# Patient Record
Sex: FEMALE | Race: WHITE | Employment: FULL TIME | URBAN - METROPOLITAN AREA
[De-identification: names, ages, dates, MRNs, and addresses within clinical notes are randomized per-mention and may not be internally consistent; named-entity substitution may affect disease eponyms.]

---

## 2017-04-05 ENCOUNTER — OFFICE VISIT (OUTPATIENT)
Dept: OBGYN CLINIC | Age: 27
End: 2017-04-05

## 2017-04-05 VITALS
HEART RATE: 74 BPM | WEIGHT: 227 LBS | DIASTOLIC BLOOD PRESSURE: 86 MMHG | HEIGHT: 66 IN | BODY MASS INDEX: 36.48 KG/M2 | SYSTOLIC BLOOD PRESSURE: 118 MMHG

## 2017-04-05 DIAGNOSIS — Z30.09 GENERAL COUNSELING AND ADVICE FOR CONTRACEPTIVE MANAGEMENT: ICD-10-CM

## 2017-04-05 DIAGNOSIS — Z11.3 SCREENING FOR VENEREAL DISEASE: ICD-10-CM

## 2017-04-05 DIAGNOSIS — N89.8 VAGINA ITCHING: ICD-10-CM

## 2017-04-05 DIAGNOSIS — N76.0 ACUTE VAGINITIS: Primary | ICD-10-CM

## 2017-04-05 LAB
PH BODY FLUID, POCT, PHBFPOCT: 4.5
SOURCE POCT, SRCEPOCT: NORMAL
WET MOUNT POCT, WMPOCT: NEGATIVE

## 2017-04-05 RX ORDER — OXCARBAZEPINE 300 MG/1
TABLET, FILM COATED ORAL
Refills: 0 | COMMUNITY
Start: 2017-03-19

## 2017-04-05 RX ORDER — DEXTROAMPHETAMINE SACCHARATE, AMPHETAMINE ASPARTATE, DEXTROAMPHETAMINE SULFATE AND AMPHETAMINE SULFATE 7.5; 7.5; 7.5; 7.5 MG/1; MG/1; MG/1; MG/1
30 TABLET ORAL
COMMUNITY

## 2017-04-05 RX ORDER — TERCONAZOLE 80 MG/1
80 SUPPOSITORY VAGINAL
Qty: 3 SUPPOSITORY | Refills: 0 | Status: SHIPPED | OUTPATIENT
Start: 2017-04-05 | End: 2017-04-08

## 2017-04-05 NOTE — PROGRESS NOTES
Vaginitis evaluation    Chief Complaint   Vaginitis      HPI  32 y.o. female complains of white vaginal discharge for 1-2 days. Patient's last menstrual period was 03/06/2017 (approximate). She has additional symptoms at this time - burning and itching. No pelvic pain or fever. The patient denies aggravating factors. No specific STI concerns, but sx did start a couple of days after IC, new partner. She denies exposure to new chemicals ot hygenic agents  Previous treatment included: none    Dx'd with bipolar. Started on Trileptal.  Stopped OCPs at recommendation of the pharmacist (was told cancels out the hormones). Has noticed improvement on the Trileptal.    Overdue for AE (1/2016). 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 Last labs WNL in 2013 per pt.  Screening for malignant neoplasm of the cervix 9/30/14    ASCUS +HPV     Past Surgical History:   Procedure Laterality Date    HX COLPOSCOPY  10/21/14    ECC - benign endocervix, detatched frag of sq mucosa sugg of HPV effect; 4:00 - HPV effect, focal atypia favor reactive; 11:00 - benign.  HX TONSILLECTOMY  1994    HX WISDOM TEETH EXTRACTION       Social History     Occupational History    Not on file. Social History Main Topics    Smoking status: Former Smoker     Packs/day: 0.50     Years: 7.00     Types: Cigarettes    Smokeless tobacco: Never Used      Comment: Never used vapor or e-cigs     Alcohol use 0.0 oz/week     0 Glasses of wine, 0 Cans of beer per week      Comment: Occasionally.  Drug use: No      Comment: Has used amphetamines 1 yr ago, but none presently.     Sexual activity: Not Currently     Partners: Male     Birth control/ protection: Pill     Family History   Problem Relation Age of Onset    Hypertension Mother    [facility] Anxiety Mother     Thyroid Disease Mother     Diabetes Mother      Pre- DM per pt. [facility] Other Mother      Per pt. mother h/o HPV and had Hysterectomy    Bipolar Disorder Father     Bipolar Disorder Sister         Allergies   Allergen Reactions    Pcn [Penicillins] Rash     Prior to Admission medications    Medication Sig Start Date End Date Taking? Authorizing Provider   OXcarbazepine (TRILEPTAL) 300 mg tablet TK 1 T PO HS 3/19/17  Yes Historical Provider   dextroamphetamine-amphetamine (ADDERALL) 30 mg tablet Take 30 mg by mouth. Yes Historical Provider   escitalopram oxalate (LEXAPRO) 20 mg tablet Take 1 Tab by mouth daily. 11/6/16  Yes Isla Spurling, MD   escitalopram oxalate (LEXAPRO) 5 mg tablet TAKE 1 TABLET BY MOUTH DAILY 11/6/16  Yes Isla Spurling, MD   PRENATAL NO.82/IRON/FOLATE #2 (TL FOLATE PO) Take  by mouth. Yes Historical Provider   cetirizine (ZYRTEC) 10 mg tablet Take 10 mg by mouth daily. Yes Historical Provider   triamcinolone (NASACORT) 55 mcg nasal inhaler 2 sprays daily. Yes Historical Provider   escitalopram oxalate (LEXAPRO) 5 mg tablet Take 1 Tab by mouth daily. 11/6/16   Isla Spurling, MD   escitalopram oxalate (LEXAPRO) 20 mg tablet TAKE 1 TABLET BY MOUTH DAILY 11/6/16   Isla Spurling, MD   norethindrone ac-eth estradiol ([facility] 1.5/30, 21,) 1.5-30 mg-mcg tab Take 1 Tab by mouth daily.  6/6/16   Delgado Pardo MD                      Review of Systems - History obtained from the patient  Constitutional: negative for weight loss, fever, night sweats  Breast: negative for breast lumps, nipple discharge, galactorrhea  GI: negative for change in bowel habits, abdominal pain, black or bloody stools  : negative for frequency, dysuria, hematuria  MSK: negative for back pain, joint pain, muscle pain  Skin: negative for itching, rash, hives  Neuro: negative for dizziness, headache, confusion, weakness  Psych: se HPI  Heme/lymph: negative for bleeding, bruising, pallor       Objective:    Visit Vitals    /86    Pulse 74    Ht 5' 6\" (1.676 m)    Wt 227 lb (103 kg)    LMP 03/06/2017 (Approximate)    BMI 36.64 kg/m2       Physical Exam:   PHYSICAL EXAMINATION    Constitutional  · Appearance: well-nourished, well developed, alert, in no acute distress    HENT  · Head and Face: appears normal    Genitourinary  · External Genitalia: normal appearance for age, no discharge present, no tenderness present, no inflammatory lesions present, no masses present, no atrophy present  · Vagina:  Small amount thick white discharge present, otherwise normal vaginal vault without central or paravaginal defects, no inflammatory lesions present, no masses present  · Bladder: non-tender to palpation  · Urethra: appears normal  · Cervix: normal   · Uterus: normal size, shape and consistency  · Adnexa: no adnexal tenderness present, no adnexal masses present  · Perineum: perineum within normal limits, no evidence of trauma, no rashes or skin lesions present  · Anus: anus within normal limits, no hemorrhoids present  · Inguinal Lymph Nodes: no lymphadenopathy present    Skin  · General Inspection: no rash, no lesions identified    Neurologic/Psychiatric  · Mental Status:  · Orientation: grossly oriented to person, place and time  · Mood and Affect: mood normal, affect appropriate          Results for orders placed or performed in visit on 04/05/17   AMB POC SMEAR, STAIN & INTERPRET, WET MOUNT   Result Value Ref Range    Wet mount (POC) negative     Narrative    WP/LILLIAM    Hypae: negative  Buds: negative  Whiff: negative    Wet Prep:  Trich: negative  Clue cells: negative  Hyphae: negative  Buds: negative  WBC's: normal   AMB POC PH, BODY FLUID EXCEPT BLOOD   Result Value Ref Range    pH,Body fluid (POC) 4.5     Source         Assessment:   Vaginal discharge and itching. WP/KOH neg.   Probably yeast.  Overdue AE  H/o ASCUS +HPV with HPV effect/koilocytosis on bx  Started on Trileptal, stopped OCPs    Plan:   NuSwab for G/C/T. Declines BV/yeast.  If sx persist, can send when she returns for her AE. Will treat empirically for yeast -> eRX Terazol-3  AE scheduled  Discussed while Trileptal may affect the metabolism of the OCPs and decrease efficacy, probably still better than nothing. Info given on [facility]. Can discuss further at next visit. Orders Placed This Encounter    CT/NG/T.VAGINALIS AMPLIFICATION     Order Specific Question:   Specimen type     Answer:   Vaginal [516]    AMB POC WET PREP (AKA STAIN, INTERPRET, WET MOUNT)    AMB POC PH, BODY FLUID EXCEPT BLOOD    terconazole (TERAZOL 3) 80 mg vaginal suppository     Sig: Insert 1 Suppository into vagina nightly for 3 days.      Dispense:  3 Suppository     Refill:  0

## 2017-04-07 LAB
C TRACH RRNA SPEC QL NAA+PROBE: NEGATIVE
N GONORRHOEA RRNA SPEC QL NAA+PROBE: NEGATIVE
T VAGINALIS RRNA SPEC QL NAA+PROBE: NEGATIVE

## 2017-05-03 ENCOUNTER — OFFICE VISIT (OUTPATIENT)
Dept: OBGYN CLINIC | Age: 27
End: 2017-05-03

## 2017-05-03 VITALS
BODY MASS INDEX: 36 KG/M2 | DIASTOLIC BLOOD PRESSURE: 96 MMHG | HEIGHT: 66 IN | HEART RATE: 95 BPM | WEIGHT: 224 LBS | SYSTOLIC BLOOD PRESSURE: 142 MMHG

## 2017-05-03 DIAGNOSIS — R87.610 ASCUS WITH POSITIVE HIGH RISK HPV CERVICAL: ICD-10-CM

## 2017-05-03 DIAGNOSIS — Z01.419 ENCOUNTER FOR GYNECOLOGICAL EXAMINATION: Primary | ICD-10-CM

## 2017-05-03 DIAGNOSIS — R87.810 ASCUS WITH POSITIVE HIGH RISK HPV CERVICAL: ICD-10-CM

## 2017-05-03 DIAGNOSIS — N92.6 IRREGULAR MENSES: ICD-10-CM

## 2017-05-03 NOTE — PROGRESS NOTES
Annual exam ages 21-44    Elena Madera is a ,  32 y.o. female [facility] Patient's last menstrual period was 2017 (approximate). , who presents for her annual checkup. Since her last annual GYN exam about one year ago on 16, she has had the following changes in her health history: None    ADDITIONAL CONCERNS:  She is having pain and bleeding hemrrhoids. With regard to the Gardasil vaccine, she is older than the FDA approved age to receive it. Menstrual status:    Her periods are moderate in flow. She is using three to five pads or tampons per day, timing irregular, may vary by a few wks. She denies dysmenorrhea. She denies premenstrual symptoms. Contraception:    The current method of family planning is none. Would like IUD    Sexual history:     She  reports that she does not currently engage in sexual activity but has had male partners.; She reports using the following method of birth control/protection: Was on OCPs, but d/c'd at the recommendation of pharmacy when she started her trileptal.        Pap and Mammogram History:    Pap (14): ASCUS +HPV  colpo (10/21/14): ECC - benign endocervix, detatched frag of sq mucosa sugg of HPV effect; 4:00 - HPV effect, focal atypia favor reactive; 11:00 - benign. Pap (16) ASCUS +HPV  colpo (3/14/16): ECC negative; cvx bx @6:00=koilocytosis/HPV effect    The patient has never had a mammogram.    Breast Cancer History/Substance Abuse:    Past Medical History:   Diagnosis Date    Abnormal Pap smear of cervix 16    ASCUS ; HPV +    Anxiety associated with depression     HSV-1 infection 2016    serum screening    Low iron     Reports not being anemic. Last labs WNL in 2013 per pt.     Screening for malignant neoplasm of the cervix 14    ASCUS +HPV     Past Surgical History:   Procedure Laterality Date    HX COLPOSCOPY  10/21/14    ECC - benign endocervix, detatched frag of sq mucosa sugg of HPV effect; 4:00 - HPV effect, focal atypia favor reactive; 11:00 - benign.  HX TONSILLECTOMY      HX WISDOM TEETH EXTRACTION       OB History    Para Term  AB SAB TAB Ectopic Multiple Living   1 1 1       1      # Outcome Date GA Lbr Charles/2nd Weight Sex Delivery Anes PTL Lv   1 Term 09 40w0d 32:00 7 lb 8 oz (3.402 kg) F VAGINAL DELI EPI N Y      Birth Comments: [place]          Current Outpatient Prescriptions   Medication Sig Dispense Refill    OXcarbazepine (TRILEPTAL) 300 mg tablet TK 1 T PO HS  0    dextroamphetamine-amphetamine (ADDERALL) 30 mg tablet Take 30 mg by mouth.  escitalopram oxalate (LEXAPRO) 20 mg tablet Take 1 Tab by mouth daily. 90 Tab 1    PRENATAL NO.82/IRON/FOLATE #2 (TL FOLATE PO) Take  by mouth.  norethindrone ac-eth estradiol (LOESTRIN 1.5/30, 21,) 1.5-30 mg-mcg tab Take 1 Tab by mouth daily. 84 Tab 2    cetirizine (ZYRTEC) 10 mg tablet Take 10 mg by mouth daily.  triamcinolone (NASACORT) 55 mcg nasal inhaler 2 sprays daily.  escitalopram oxalate (LEXAPRO) 5 mg tablet Take 1 Tab by mouth daily. 90 Tab 1    escitalopram oxalate (LEXAPRO) 5 mg tablet TAKE 1 TABLET BY MOUTH DAILY 90 Tab 0    escitalopram oxalate (LEXAPRO) 20 mg tablet TAKE 1 TABLET BY MOUTH DAILY 90 Tab 0     Allergies: Pcn [penicillins]   Social History     Social History    Marital status: SINGLE     Spouse name: N/A    Number of children: N/A    Years of education: N/A     Occupational History    Not on file. Social History Main Topics    Smoking status: Former Smoker     Packs/day: 0.50     Years: 7.00     Types: Cigarettes    Smokeless tobacco: Never Used      Comment: Never used vapor or e-cigs     Alcohol use 0.0 oz/week     0 Glasses of wine, 0 Cans of beer per week      Comment: Occasionally.  Drug use: No      Comment: Has used amphetamines 1 yr ago, but none presently.     Sexual activity: Not Currently     Partners: Male     Birth control/ protection: Pill     Other Topics Concern    Not on file     Social History Narrative     Tobacco History:  reports that she has quit smoking. Her smoking use included Cigarettes. She has a 3.50 pack-year smoking history. She has never used smokeless tobacco.  Alcohol Abuse:  reports that she drinks alcohol. Drug Abuse:  reports that she does not use illicit drugs. Patient Active Problem List   Diagnosis Code    Anxiety associated with depression F41.8    Abnormal pap [id number]     Family History   Problem Relation Age of Onset    Hypertension Mother    [facility] Anxiety Mother     Thyroid Disease Mother     Diabetes Mother      Pre- DM per pt.    [facility] Other Mother      Per pt. mother h/o HPV and had Hysterectomy    Bipolar Disorder Father     Bipolar Disorder Sister        Review of Systems - History obtained from the patient  Constitutional: negative for weight loss, fever, night sweats  HEENT: negative for hearing loss, earache, congestion, snoring, sorethroat  CV: negative for chest pain, palpitations, edema  Resp: negative for cough, shortness of breath, wheezing  GI: negative for change in bowel habits, abdominal pain, black or bloody stools  : negative for frequency, dysuria, hematuria, vaginal discharge  MSK: negative for back pain, joint pain, muscle pain  Breast: negative for breast lumps, nipple discharge, galactorrhea  Skin :negative for itching, rash, hives  Neuro: negative for dizziness, headache, confusion, weakness  Psych: negative for anxiety, depression, change in mood  Heme/lymph: negative for bleeding, bruising, pallor    Physical Exam    Visit Vitals    BP (!) 142/96    Pulse 95    Ht 5' 6\" (1.676 m)    Wt 224 lb (101.6 kg)    LMP 04/07/2017 (Approximate)    BMI 36.15 kg/m2       Constitutional  · Appearance: well-nourished, well developed, alert, in no acute distress    HENT  · Head and Face: appears normal    Neck  · Inspection/Palpation: normal appearance, no masses or tenderness  · Lymph Nodes: no lymphadenopathy present  · Thyroid: gland size normal, nontender, no nodules or masses present on palpation    Chest  · Respiratory Effort: breathing unlabored  · Auscultation: normal breath sounds    Cardiovascular  · Heart:  · Auscultation: regular rate and rhythm without murmur    Breasts  · Inspection of Breasts: breasts symmetrical, no skin changes, no discharge present, nipple appearance normal, no skin retraction present  · Palpation of Breasts and Axillae: no masses present on palpation, no breast tenderness  · Axillary Lymph Nodes: no lymphadenopathy present    Gastrointestinal  · Abdominal Examination: abdomen non-tender to palpation, normal bowel sounds, no masses present  · Liver and spleen: no hepatomegaly present, spleen not palpable  · Hernias: no hernias identified    Genitourinary  · External Genitalia: normal appearance for age, no discharge present, no tenderness present, no inflammatory lesions present, no masses present, no atrophy present  · Vagina: normal vaginal vault without central or paravaginal defects, no discharge present, no inflammatory lesions present, no masses present  · Bladder: non-tender to palpation  · Urethra: appears normal  · Cervix: normal   · Uterus: normal size, shape and consistency  · Adnexa: no adnexal tenderness present, no adnexal masses present  · Perineum: perineum within normal limits, no evidence of trauma, no rashes or skin lesions present  · Anus: anus within normal limits, hemorrhoid present  · Inguinal Lymph Nodes: no lymphadenopathy present @ 5:00    Skin  · General Inspection: no rash, no lesions identified    Neurologic/Psychiatric  · Mental Status:  · Orientation: grossly oriented to person, place and time  · Mood and Affect: mood normal, affect appropriate            Assessment & Plan:  · Routine gynecologic examination  · ASCUS +HPV -> pap/HPV today. · Irregular cycles.   Previously on OCPs, but d/c'd at pharmacist's recommendation when she started Trileptal.  Would like IUD. Reviewed Domingo Sargent. Will plan on Mirena. Rec Motrin 600-800mg 2hrs prior to appt, take with food. If she needs colposcopy, recommend doing this prior to IUD insertion. · Her current medical status is satisfactory with no evidence of significant gynecologic issues. · Counseled re: diet, exercise, healthy lifestyle  · Return for yearly wellness visits  · Pt counseled regarding co-testing for high risk HPV with pap  · Patient verbalized understanding    Orders Placed This Encounter    PAP IG, HPV AND RFX HPV 96/75,32([id number])     Scheduling Instructions:      Pap (9/30/14): ASCUS +HPV      colpo (10/21/14): ECC - benign endocervix, detatched frag of sq mucosa sugg of HPV effect; 4:00 - HPV effect, focal atypia favor reactive; 11:00 - benign. Pap (1/25/16) ASCUS +HPV      colpo (3/14/16): ECC negative; cvx bx @6:00=koilocytosis/HPV effect     Order Specific Question:   Pap Source? Answer:   Cervical and Endocervical     Order Specific Question:   Total Hysterectomy? Answer:   No     Order Specific Question:   Supracervical Hysterectomy? Answer:   No     Order Specific Question:   Post Menopausal?     Answer:   No     Order Specific Question:   Hormone Therapy? Answer:   No     Order Specific Question:   IUD? Answer:   No     Order Specific Question:   Abnormal Bleeding? Answer:   No     Order Specific Question:   Pregnant     Answer:   No     Order Specific Question:   Post Partum? Answer:    No

## 2017-05-03 NOTE — PROGRESS NOTES
Annual exam ages 21-44    Donte Clinton is a ,  32 y.o. female [facility] Patient's last menstrual period was 2017 (approximate). , who presents for her annual checkup. Since her last annual GYN exam about one year ago on 16, she has had the following changes in her health history: None    ADDITIONAL CONCERNS:  She is having pain and bleeding hemrrhoids. With regard to the Gardasil vaccine, she is older than the FDA approved age to receive it. Menstrual status:    Her periods are moderate in flow. She is using three to five pads or tampons per day, usually regular and last 26-30 days. She denies dysmenorrhea. She denies premenstrual symptoms. Contraception:    The current method of family planning is none. Would like Melissa IUD    Sexual history:     She  reports that she does not currently engage in sexual activity but has had male partners.; She reports using the following method of birth control/protection: Pill. .        Pap and Mammogram History:    Pap (14): ASCUS +HPV  colpo (10/21/14): ECC - benign endocervix, detatched frag of sq mucosa sugg of HPV effect; 4:00 - HPV effect, focal atypia favor reactive; 11:00 - benign. Pap (16) ASCUS +HPV  colpo (3/14/16): ECC negative; cvx bx @6:00=koilocytosis/HPV effect    The patient has never had a mammogram.    Breast Cancer History/Substance Abuse:    Past Medical History:   Diagnosis Date    Abnormal Pap smear of cervix 16    ASCUS ; HPV +    Anxiety associated with depression     HSV-1 infection 2016    serum screening    Low iron     Reports not being anemic. Last labs WNL in  per pt.  Screening for malignant neoplasm of the cervix 14    ASCUS +HPV     Past Surgical History:   Procedure Laterality Date    HX COLPOSCOPY  10/21/14    ECC - benign endocervix, detatched frag of sq mucosa sugg of HPV effect; 4:00 - HPV effect, focal atypia favor reactive; 11:00 - benign.     HX TONSILLECTOMY      HX WISDOM TEETH EXTRACTION       OB History    Para Term  AB SAB TAB Ectopic Multiple Living   1 1 1       1      # Outcome Date GA Lbr Charles/2nd Weight Sex Delivery Anes PTL Lv   1 Term 09 40w0d 32:00 7 lb 8 oz (3.402 kg) F VAGINAL DELI EPI N Y      Birth Comments: [place]          Current Outpatient Prescriptions   Medication Sig Dispense Refill    OXcarbazepine (TRILEPTAL) 300 mg tablet TK 1 T PO HS  0    dextroamphetamine-amphetamine (ADDERALL) 30 mg tablet Take 30 mg by mouth.  escitalopram oxalate (LEXAPRO) 20 mg tablet Take 1 Tab by mouth daily. 90 Tab 1    PRENATAL NO.82/IRON/FOLATE #2 (TL FOLATE PO) Take  by mouth.  norethindrone ac-eth estradiol (LOESTRIN 1.5/30, 21,) 1.5-30 mg-mcg tab Take 1 Tab by mouth daily. 84 Tab 2    cetirizine (ZYRTEC) 10 mg tablet Take 10 mg by mouth daily.  triamcinolone (NASACORT) 55 mcg nasal inhaler 2 sprays daily.  escitalopram oxalate (LEXAPRO) 5 mg tablet Take 1 Tab by mouth daily. 90 Tab 1    escitalopram oxalate (LEXAPRO) 5 mg tablet TAKE 1 TABLET BY MOUTH DAILY 90 Tab 0    escitalopram oxalate (LEXAPRO) 20 mg tablet TAKE 1 TABLET BY MOUTH DAILY 90 Tab 0     Allergies: Pcn [penicillins]   Social History     Social History    Marital status: SINGLE     Spouse name: N/A    Number of children: N/A    Years of education: N/A     Occupational History    Not on file. Social History Main Topics    Smoking status: Former Smoker     Packs/day: 0.50     Years: 7.00     Types: Cigarettes    Smokeless tobacco: Never Used      Comment: Never used vapor or e-cigs     Alcohol use 0.0 oz/week     0 Glasses of wine, 0 Cans of beer per week      Comment: Occasionally.  Drug use: No      Comment: Has used amphetamines 1 yr ago, but none presently.     Sexual activity: Not Currently     Partners: Male     Birth control/ protection: Pill     Other Topics Concern    Not on file     Social History Narrative Tobacco History:  reports that she has quit smoking. Her smoking use included Cigarettes. She has a 3.50 pack-year smoking history. She has never used smokeless tobacco.  Alcohol Abuse:  reports that she drinks alcohol. Drug Abuse:  reports that she does not use illicit drugs. Patient Active Problem List   Diagnosis Code    Anxiety associated with depression F41.8    Abnormal pap [id number]     Family History   Problem Relation Age of Onset    Hypertension Mother    [facility] Anxiety Mother     Thyroid Disease Mother     Diabetes Mother      Pre- DM per pt.    [facility] Other Mother      Per pt. mother h/o HPV and had Hysterectomy    Bipolar Disorder Father     Bipolar Disorder Sister        Review of Systems - History obtained from the patient  Constitutional: negative for weight loss, fever, night sweats  HEENT: negative for hearing loss, earache, congestion, snoring, sorethroat  CV: negative for chest pain, palpitations, edema  Resp: negative for cough, shortness of breath, wheezing  GI: negative for change in bowel habits, abdominal pain, black or bloody stools  : negative for frequency, dysuria, hematuria, vaginal discharge  MSK: negative for back pain, joint pain, muscle pain  Breast: negative for breast lumps, nipple discharge, galactorrhea  Skin :negative for itching, rash, hives  Neuro: negative for dizziness, headache, confusion, weakness  Psych: negative for anxiety, depression, change in mood  Heme/lymph: negative for bleeding, bruising, pallor    Physical Exam    Visit Vitals    BP (!) 142/96    Pulse 95    Ht 5' 6\" (1.676 m)    Wt 224 lb (101.6 kg)    LMP 04/07/2017 (Approximate)    BMI 36.15 kg/m2       Constitutional  · Appearance: well-nourished, well developed, alert, in no acute distress    HENT  · Head and Face: appears normal    Neck  · Inspection/Palpation: normal appearance, no masses or tenderness  · Lymph Nodes: no lymphadenopathy present  · Thyroid: gland size normal, nontender, no nodules or masses present on palpation    Chest  · Respiratory Effort: breathing unlabored  · Auscultation: normal breath sounds    Cardiovascular  · Heart:  · Auscultation: regular rate and rhythm without murmur    Breasts  · Inspection of Breasts: breasts symmetrical, no skin changes, no discharge present, nipple appearance normal, no skin retraction present  · Palpation of Breasts and Axillae: no masses present on palpation, no breast tenderness  · Axillary Lymph Nodes: no lymphadenopathy present    Gastrointestinal  · Abdominal Examination: abdomen non-tender to palpation, normal bowel sounds, no masses present  · Liver and spleen: no hepatomegaly present, spleen not palpable  · Hernias: no hernias identified    Genitourinary  · External Genitalia: normal appearance for age, no discharge present, no tenderness present, no inflammatory lesions present, no masses present, no atrophy present  · Vagina: normal vaginal vault without central or paravaginal defects, no discharge present, no inflammatory lesions present, no masses present  · Bladder: non-tender to palpation  · Urethra: appears normal  · Cervix: normal   · Uterus: normal size, shape and consistency  · Adnexa: no adnexal tenderness present, no adnexal masses present  · Perineum: perineum within normal limits, no evidence of trauma, no rashes or skin lesions present  · Anus: anus within normal limits, no hemorrhoids present  · Inguinal Lymph Nodes: no lymphadenopathy present    Skin  · General Inspection: no rash, no lesions identified    Neurologic/Psychiatric  · Mental Status:  · Orientation: grossly oriented to person, place and time  · Mood and Affect: mood normal, affect appropriate      No results found for any visits on 05/03/17. Assessment & Plan:  · Routine gynecologic examination  · Her current medical status is satisfactory with no evidence of significant gynecologic issues.   · Counseled re: diet, exercise, healthy lifestyle  · Return for yearly wellness visits  · Gardisil counseling provided  · Pt counseled regarding co-testing for high risk HPV with pap  · Rec screening mammo  · Patient verbalized understanding

## 2017-05-03 NOTE — PATIENT INSTRUCTIONS

## 2017-05-05 LAB
CYTOLOGIST CVX/VAG CYTO: ABNORMAL
CYTOLOGY CVX/VAG DOC THIN PREP: ABNORMAL
CYTOLOGY HISTORY:: ABNORMAL
DX ICD CODE: ABNORMAL
DX ICD CODE: ABNORMAL
HPV I/H RISK 1 DNA CVX QL PROBE+SIG AMP: NEGATIVE
Lab: ABNORMAL
OTHER STN SPEC: ABNORMAL
PATH REPORT.FINAL DX SPEC: ABNORMAL
PATHOLOGIST CVX/VAG CYTO: ABNORMAL
STAT OF ADQ CVX/VAG CYTO-IMP: ABNORMAL

## 2017-05-09 ENCOUNTER — TELEPHONE (OUTPATIENT)
Dept: OBGYN CLINIC | Age: 27
End: 2017-05-09

## 2017-05-09 NOTE — TELEPHONE ENCOUNTER
We can do the IUD insertion if she would like. However, I generally recommend that the colposcopy be done first -- it is possible that the IUD could be inadvertently pulled out while we are doing biopsies.

## 2017-05-09 NOTE — TELEPHONE ENCOUNTER
Call received at 1:19pm      32year old patient last seen in the office on 5/3/17. Patient calling back the office to check on her lab results. Patient advised of MD reviewed lab results and recommendations and was placed on the schedule have a colposcopy on 7/17/17 at 2:00pm.      Patient thinks she might be on her cycle and would like to turn the appointment to insertion of IUD if she can not have the colposcopy done. Patient does not want to wait another whole month to get her cycle.    Please advise

## 2017-05-09 NOTE — PROGRESS NOTES
Patient advised of MD reviewed lab results and recommendations. Patient placed on the schedule for 5/17/17 for colposcopy. Patient verbalized understanding.  This nurse will send message to MD regarding patient request.

## 2017-05-15 NOTE — TELEPHONE ENCOUNTER
Patient aware of MD recommendations and per DM, patient ok to have IUD insertion appt for 05/17/17 and R/S colpo for 06/26/17.

## 2017-05-17 ENCOUNTER — OFFICE VISIT (OUTPATIENT)
Dept: OBGYN CLINIC | Age: 27
End: 2017-05-17

## 2017-05-17 VITALS
HEART RATE: 82 BPM | BODY MASS INDEX: 36 KG/M2 | HEIGHT: 66 IN | DIASTOLIC BLOOD PRESSURE: 78 MMHG | SYSTOLIC BLOOD PRESSURE: 126 MMHG | WEIGHT: 224 LBS

## 2017-05-17 DIAGNOSIS — Z32.02 NEGATIVE PREGNANCY TEST: ICD-10-CM

## 2017-05-17 DIAGNOSIS — N92.6 IRREGULAR MENSES: ICD-10-CM

## 2017-05-17 DIAGNOSIS — Z30.430 ENCOUNTER FOR IUD INSERTION: Primary | ICD-10-CM

## 2017-05-17 LAB
HCG URINE, QL. (POC): NEGATIVE
VALID INTERNAL CONTROL?: YES

## 2017-05-17 NOTE — PATIENT INSTRUCTIONS
Intrauterine Device (IUD) Insertion: Care Instructions  Your Care Instructions    The intrauterine device (IUD) is a very effective method of birth control. It is a small, plastic, T-shaped device that contains copper or hormones. The doctor inserts the IUD into your uterus. A plastic string tied to the end of the IUD hangs down through the cervix into the vagina. There are two types of IUDs. The copper IUD is effective for up to 10 years. The hormonal IUD is effective for either 3 years or 5 years, depending on which IUD is used. The hormonal IUD also reduces menstrual bleeding and cramping. Both types of IUD damage or kill the man's sperm. This means that the woman's egg does not join with the sperm. IUDs also change the lining of the uterus so that the egg does not lodge there. The IUD is most likely to work well for women who have been pregnant before. Some women who have never been pregnant have more trouble keeping the IUD in the uterus. They also may have more pain and cramping after insertion. Follow-up care is a key part of your treatment and safety. Be sure to make and go to all appointments, and call your doctor if you are having problems. It's also a good idea to know your test results and keep a list of the medicines you take. How can you care for yourself at home? · You may experience some mild cramping and light bleeding (spotting) for 1 or 2 days. Use a hot water bottle or a heating pad set on low on your belly for pain. · Take an over-the-counter pain medicine, such as acetaminophen (Tylenol), ibuprofen (Advil, Motrin), and naproxen (Aleve) if needed. Read and follow all instructions on the label. · Do not take two or more pain medicines at the same time unless the doctor told you to. Many pain medicines have acetaminophen, which is Tylenol. Too much acetaminophen (Tylenol) can be harmful. · Check the string of your IUD after every period.  To do this, insert a finger into your vagina and feel for the cervix, which is at the top of the vagina and feels harder than the rest of your vagina. You should be able to feel the thin, plastic string coming out of the opening of your cervix. If you cannot feel the string, use another form of birth control and make an appointment with your doctor to have the string checked. · If the IUD comes out, save it and call your doctor. Be sure to use another form of birth control while the IUD is out. · Use latex condoms to protect against sexually transmitted infections (STIs), such as gonorrhea and chlamydia. An IUD does not protect you from STIs. Having one sex partner (who does not have STIs and does not have sex with anyone else) is a good way to avoid STIs. When should you call for help? Call 911 anytime you think you may need emergency care. For example, call if:  · You passed out (lost consciousness). · You have sudden, severe pain in your belly or pelvis. Call your doctor now or seek immediate medical care if:  · You have new belly or pelvic pain. · You have severe vaginal bleeding. This means that you are soaking through your usual pads or tampons each hour for 2 or more hours. · You are dizzy or lightheaded, or you feel like you may faint. · You have a fever and pelvic pain or vaginal discharge. · You have pelvic pain that is getting worse. Watch closely for changes in your health, and be sure to contact your doctor if:  · You cannot feel the string, or the IUD comes out. · You feel sick to your stomach, or you vomit. · You think you may be pregnant. Where can you learn more? Go to http://aurea-matheus.info/. Enter L676 in the search box to learn more about \"Intrauterine Device (IUD) Insertion: Care Instructions. \"  Current as of: May 30, 2016  Content Version: 11.2  © 8968-8775 [employer].  Care instructions adapted under license by [employer] (which disclaims liability or warranty for this information). If you have questions about a medical condition or this instruction, always ask your healthcare professional. [place] [address] any warranty or liability for your use of this information.

## 2017-05-31 ENCOUNTER — OFFICE VISIT (OUTPATIENT)
Dept: OBGYN CLINIC | Age: 27
End: 2017-05-31

## 2017-05-31 VITALS
SYSTOLIC BLOOD PRESSURE: 124 MMHG | HEIGHT: 66 IN | DIASTOLIC BLOOD PRESSURE: 80 MMHG | BODY MASS INDEX: 36.48 KG/M2 | WEIGHT: 227 LBS

## 2017-05-31 DIAGNOSIS — Z32.02 NEGATIVE PREGNANCY TEST: ICD-10-CM

## 2017-05-31 DIAGNOSIS — R87.612 LGSIL ON PAP SMEAR OF CERVIX: Primary | ICD-10-CM

## 2017-05-31 LAB
HCG URINE, QL. (POC): NEGATIVE
VALID INTERNAL CONTROL?: YES

## 2017-05-31 NOTE — PATIENT INSTRUCTIONS
Colposcopy: What to Expect at [address] [place] [address] may feel some soreness in your vagina for a day or two if you had a biopsy. Some vaginal bleeding or discharge is normal for up to a week after a biopsy. The discharge may be dark-colored if a solution was put on your cervix. You can use a sanitary pad for the bleeding. It may take a week or two for you to get the test results. This care sheet gives you a general idea about how long it will take for you to recover. But each person recovers at a different pace. Follow the steps below to feel better as quickly as possible. How can you care for yourself at home? Activity  · You can return to work and most daily activities right after the test.  Exercise  · Do not exercise for 1 day after the test.  Medicines  · Your doctor will tell you if and when you can restart your medicines. He or she will also give you instructions about taking any new medicines. · If you take blood thinners, such as warfarin (Coumadin), clopidogrel (Plavix), or aspirin, be sure to talk to your doctor. He or she will tell you if and when to start taking those medicines again. Make sure that you understand exactly what your doctor wants you to do. · Take an over-the-counter pain medicine, such as acetaminophen (Tylenol), ibuprofen (Advil, Motrin), or naproxen (Aleve). Be safe with medicines. Read and follow all instructions on the label. Do not take two or more pain medicines at the same time unless the doctor told you to. Many pain medicines have acetaminophen, which is Tylenol. Too much acetaminophen (Tylenol) can be harmful. Other instructions  · Use a pad if you have some bleeding. · Do not douche, have sexual intercourse, or use tampons for 1 week if you had a biopsy. This will allow time for your cervix to heal.  · You can take a bath or shower anytime after the test.  Follow-up care is a key part of your treatment and safety.  Be sure to make and go to all appointments, and call your doctor if you are having problems. It's also a good idea to know your test results and keep a list of the medicines you take. When should you call for help? Call your doctor now or seek immediate medical care if:  · You have severe vaginal bleeding. This means that you are soaking through your usual pads or tampons each hour for 2 or more hours. · You have pain that does not get better after you take pain medicine. · You have signs of infection, such as:  ¨ Increased pain. ¨ Bad-smelling vaginal discharge. ¨ A fever. Watch closely for any changes in your health, and be sure to contact your doctor if:  · You have questions or concerns. Where can you learn more? Go to http://aurea-matheus.info/. Enter M523 in the search box to learn more about \"Colposcopy: What to Expect at Home. \"  Current as of: July 26, 2016  Content Version: 11.2  © 0992-5480 [employer]. Care instructions adapted under license by [employer] (which disclaims liability or warranty for this information). If you have questions about a medical condition or this instruction, always ask your healthcare professional. [place] [address] any warranty or liability for your use of this information.

## 2017-05-31 NOTE — PROGRESS NOTES
[facility]  OFFICE PROCEDURE PROGRESS NOTE        Chart reviewed for the following:   Tyrone Hector, have reviewed the History, Physical and updated the Allergic reactions for [address] performed immediately prior to start of procedure:   Sudhir GEORGE, have performed the following reviews on Hunter Prieto prior to the start of the procedure:            * Patient was identified by name and date of birth   * Agreement on procedure being performed was verified  * Risks and Benefits explained to the patient  * Procedure site verified and marked as necessary  * Patient was positioned for comfort  * Consent was signed and verified     Time: 1150      Date of procedure: 2017    Procedure performed by:  Severo Seay MD    Provider assisted by: Sandra URIBE    Patient assisted by: self    How tolerated by patient: tolerated the procedure well with no complications    Post Procedural Pain Scale: 0 - No Hurt    Comments: none        Procedure Note: Colposcopy    Hunter Prieto is a ,  32 y.o. female [facility] Patient's last menstrual period was 2017 (exact date). She presents for colposcopy for evaluation of a cervical abnormality with a pap smear abnormality consisting of LGSIL. Pap (14): ASCUS +HPV  colpo (10/21/14): ECC - benign endocervix, detatched frag of sq mucosa sugg of HPV effect; 4:00 - HPV effect, focal atypia favor reactive; 11:00 - benign. Pap (16) ASCUS +HPV  colpo (3/14/16): ECC negative; cvx bx @6:00=koilocytosis/HPV effect  Pap (5/3/17) LGSIL, HPV neg      After being presented with the risks, benefits and alternatives has she signed a consent for the procedure. She states that she understands the need for the procedure and has no further questions. She was informed that she may experience discomfort.   Procedure:  She was positioned in the dorsal lithotomy position and a speculum was inserted into the vagina. IUD strings seen, nl length 3.5-4cm. Dilute acetic acid was applied to the cervix. The colposcope was used to visualize the cervix. Procedure: The transformation zone was completely visualized. Findings: This colposcopy was satisfactory. The procedure was notable for white epithelium on the cervix. Biopsies were taken from the cervix @ 12 and 6. See accompanying diagram for biopsy sites. Silver nitrate was applied to the cervix. Endocervical currettage: An endocervical curettage was performed. Post Procedure Status: The patient tolerated the procedure well with minimal discomfort. She was observed for 10 minutes and released in good condition.

## 2017-06-03 LAB
DX ICD CODE: NORMAL
DX ICD CODE: NORMAL
PATH REPORT.FINAL DX SPEC: NORMAL
PATH REPORT.GROSS SPEC: NORMAL
PATH REPORT.RELEVANT HX SPEC: NORMAL
PATH REPORT.SITE OF ORIGIN SPEC: NORMAL
PATHOLOGIST NAME: NORMAL
PAYMENT PROCEDURE: NORMAL

## 2017-06-23 ENCOUNTER — OFFICE VISIT (OUTPATIENT)
Dept: FAMILY MEDICINE CLINIC | Age: 27
End: 2017-06-23

## 2017-06-23 VITALS
HEIGHT: 66 IN | OXYGEN SATURATION: 97 % | RESPIRATION RATE: 18 BRPM | WEIGHT: 224.2 LBS | SYSTOLIC BLOOD PRESSURE: 122 MMHG | BODY MASS INDEX: 36.03 KG/M2 | TEMPERATURE: 98 F | HEART RATE: 75 BPM | DIASTOLIC BLOOD PRESSURE: 76 MMHG

## 2017-06-23 DIAGNOSIS — G56.01 CARPAL TUNNEL SYNDROME OF RIGHT WRIST: ICD-10-CM

## 2017-06-23 DIAGNOSIS — M25.532 PAIN IN BOTH WRISTS: Primary | ICD-10-CM

## 2017-06-23 DIAGNOSIS — G56.02 CARPAL TUNNEL SYNDROME OF LEFT WRIST: ICD-10-CM

## 2017-06-23 DIAGNOSIS — M25.531 PAIN IN BOTH WRISTS: Primary | ICD-10-CM

## 2017-06-23 RX ORDER — LIDOCAINE HYDROCHLORIDE 10 MG/ML
2 INJECTION INFILTRATION; PERINEURAL ONCE
Qty: 2 ML | Refills: 0
Start: 2017-06-23 | End: 2017-06-23

## 2017-06-23 RX ORDER — BETAMETHASONE SODIUM PHOSPHATE AND BETAMETHASONE ACETATE 3; 3 MG/ML; MG/ML
6 INJECTION, SUSPENSION INTRA-ARTICULAR; INTRALESIONAL; INTRAMUSCULAR; SOFT TISSUE ONCE
Qty: 2 ML | Refills: 0
Start: 2017-06-23 | End: 2017-06-23

## 2017-06-23 NOTE — PROGRESS NOTES
[facility]  OFFICE PROCEDURE PROGRESS NOTE        Chart reviewed for the following:   IDr. Abimael, have reviewed the History, Physical and updated the Allergic reactions for [address] performed immediately prior to start of procedure:   IDr. Abimael, have performed the following reviews on Rema Gunter prior to the start of the procedure:            * Patient was identified by name and date of birth   * Agreement on procedure being performed was verified  * Risks and Benefits explained to the patient  * Procedure site verified and marked as necessary  * Patient was positioned for comfort  * Consent was signed and verified     Time: 14:25pm      Date of procedure: 6/23/2017    Procedure performed by:  Charito Zavala MD    Provider assisted by:  Gladis Cole LPN    Patient assisted by: self    How tolerated by patient: tolerated the procedure well with no complications    Post Procedural Pain Scale: 2 - Hurts Little Bit    Comments: none

## 2017-06-23 NOTE — LETTER
NOTIFICATION RETURN TO WORK / SCHOOL 
 
6/23/2017 2:37 PM 
 
Ms. Maximiliano Hubbard [address] [facility] [address] [id number] To Whom It May Concern: 
 
Maximiliano Hubbard is currently under the care of [facility]. She will return to work/school on: 06/27/2017 If there are questions or concerns please have the patient contact our office.  
 
 
 
Sincerely, 
 
 
Petr Yañez MD

## 2017-06-23 NOTE — PROGRESS NOTES
Subjective:      Quin Jain is a 32 y.o. female seen for evaluation and treatment of bilateral wrist pain. Pain is chronically intermittent. Pain usually only last a couple of days. Current sx for 1 week. Pain and numbness/tingling in median nerve distribution. No injury or trauma. Wearing night splints without relief. Works as a  and work increased sx. Also did a lot of cleaning prior to sx beginning. She had a Merana placed 1 month ago. Meds:   Current Outpatient Prescriptions   Medication Sig Dispense Refill    levonorgestrel (MIRENA) 20 mcg/24 hr (5 years) IUD 1 Each by IntraUTERine route once.  OXcarbazepine (TRILEPTAL) 300 mg tablet TK 1 T PO HS  0    dextroamphetamine-amphetamine (ADDERALL) 30 mg tablet Take 30 mg by mouth.  escitalopram oxalate (LEXAPRO) 20 mg tablet Take 1 Tab by mouth daily. 90 Tab 1    PRENATAL NO.82/IRON/FOLATE #2 (TL FOLATE PO) Take  by mouth.  norethindrone ac-eth estradiol (LOESTRIN 1.5/30, 21,) 1.5-30 mg-mcg tab Take 1 Tab by mouth daily. 84 Tab 2    cetirizine (ZYRTEC) 10 mg tablet Take 10 mg by mouth daily.  triamcinolone (NASACORT) 55 mcg nasal inhaler 2 sprays daily. Allergies: Allergies   Allergen Reactions    Pcn [Penicillins] Rash       Smoker:  History   Smoking Status    Former Smoker    Packs/day: 0.50    Years: 7.00    Types: Cigarettes   Smokeless Tobacco    Never Used     Comment: Never used vapor or e-cigs        ETOH:   History   Alcohol Use    0.0 oz/week    0 Glasses of wine, 0 Cans of beer per week     Comment: Occasionally. FH:   Family History   Problem Relation Age of Onset    Hypertension Mother     Anxiety Mother     Thyroid Disease Mother     Diabetes Mother      Pre- DM per pt.    [facility] Other Mother      Per pt. mother h/o HPV and had Hysterectomy    Bipolar Disorder Father     Bipolar Disorder Sister        ROS: Per HPI    Objective:     Visit Vitals    /76 (BP 1 Location: Right arm, BP Patient Position: Sitting)    Pulse 75    Temp 98 °F (36.7 °C) (Oral)    Resp 18    Ht 5' 6\" (1.676 m)    Wt 224 lb 3.2 oz (101.7 kg)    SpO2 97%    BMI 36.19 kg/m2      General: Alert and oriented and in no acute distress. Responds to all questions appropriately. Wrist: bilaterally  Wrist Effusion: None  Deformity: None    ROM:  Flexion:  Extension:  Ulna deviation:  Radial deviation:    Palpation:  Snuff box tenderness: None  TFCC tenderness: None  Ulna styloid tenderness: None  Distal radius tenderness: None  Hook of Hamate tenderness: None  Second compartment (intersection) tenderness: None    Other test:  Finkelsteins test: Negative  Phalens test: Negative  Tinels test: Negative  Ulnar sided compression test: Negative  Sotos test: Negative    Strength (0-5/5):   Flexion:5/5  Extension: 5/5  : 5/5  Intrinsics: 5/5  EPL (extensor pollicis longus): 5/5  Pinch mechanism: 5/5    Neuro/Vascular: Pulses intact, no edema, and neurologically intact. Skin: No obvious rash. Procedure:    Informed consent obtained verbally and risks, benefits and alternatives discussed. Time out performed, cross checking patient ID and procedure. Ultrasound was performed of the right wrist and the median nerve, flexor tendons, ulnar and radial arteries identified. The right wrist was cleaned and prepped with sterile technique using Chloraprep x3, anesthetized with ethyl chloride spray and injected using ultrasound guidance with Celestone 6 mg and 2ml of 1% lidocaine under sterile conditions. The patient tolerated the procedure well and there were no complications. Informed consent obtained verbally and risks, benefits and alternatives discussed. Time out performed, cross checking patient ID and procedure. Ultrasound was performed of the left wrist and the median nerve, flexor tendons, ulnar and radial arteries identified.   The left wrist was cleaned and prepped with sterile technique using Chloraprep x3, anesthetized with ethyl chloride spray and injected using ultrasound guidance with Celestone 6 mg and 2ml of 1% lidocaine under sterile conditions. The patient tolerated the procedure well and there were no complications. Assessment/Plan:       ICD-10-CM ICD-9-CM    1. Pain in both wrists M25.531 719.43     M25.532     Carpal tunnel syndrome. Most likely related to repetitive work she does with her hands and recent increase in work prior to [employer] starting. Unlikely related to IUD although this was a concern of hers. PLAN:   Corticosteroid injections as above  Wrist splints for night and activities    Medications:    1. Ibuprofen (Motrin, Advil): 200mg - take 1-4 three times a day PRN   -OR-    Naproxin (Aleve): 220mg 1-2 tablets twice a day PRN  2. Acetaminophen (Tylenol): 500mg 1-2 tablets every 6 hours as needed for pain. Follow Up: PRN. If no improvement with steroid injection, then will refer for surgical release.

## 2017-06-26 ENCOUNTER — OFFICE VISIT (OUTPATIENT)
Dept: OBGYN CLINIC | Age: 27
End: 2017-06-26

## 2017-06-26 DIAGNOSIS — Z30.432 ENCOUNTER FOR IUD REMOVAL: Primary | ICD-10-CM

## 2017-06-26 DIAGNOSIS — G56.00 CARPAL TUNNEL SYNDROME, UNSPECIFIED LATERALITY: ICD-10-CM

## 2017-06-26 NOTE — PATIENT INSTRUCTIONS
Pelvic Exam: Care Instructions  Your Care Instructions    When your doctor examines all of your pelvic organs, it's called a pelvic exam. Two good reasons to have this kind of exam are to check for sexually transmitted infections (STIs) and to get a Pap test. A Pap test is also called a Pap smear. It checks for early changes that can lead to cancer of the cervix. Sometimes a pelvic exam is part of a regular checkup. In this case, you can do some things to make your test results as accurate as possible. · Try to schedule the exam when you don't have your period. · Don't use douches, tampons, or vaginal medicines, sprays, or powders for 24 hours before your exam.  · Don't have sex for 24 hours before your exam.  Other times, women have this kind of exam at any time of the month. This is because they have pelvic pain, bleeding, or discharge. Or they may have another pelvic problem. Before your exam, it's important to share some information with your doctor. For example, if you are a survivor of rape or sexual abuse, you can talk about any concerns you may have. Your doctor will also want to know if you are pregnant or use birth control. And he or she will want to hear about any problems, surgeries, or procedures you have had in your pelvic area. You will also need to tell your doctor when your last period was. Follow-up care is a key part of your treatment and safety. Be sure to make and go to all appointments, and call your doctor if you are having problems. It's also a good idea to know your test results and keep a list of the medicines you take. How is a pelvic exam done? · During a pelvic exam, you will:  ¨ Take off your clothes below the waist. You will get a paper or cloth cover to put over the lower half of your body. Piero Mcgregor on your back on an exam table. Your feet will be raised above you. Stirrups will support your feet. · The doctor will:  Pedro Esther you to relax your knees.  Your knees need to lean out, toward the walls. ¨ Check the opening of your vagina for sores or swelling. ¨ Gently put a tool called a speculum into your vagina. It opens the vagina a little bit. You will feel some pressure. But if you are relaxed, it will not hurt. It lets your doctor see inside the vagina. ¨ Use a small brush, spatula, or swab to get a sample of cells, if you are having a Pap test or culture. The doctor then removes the speculum. ¨ Put on gloves and put one or two fingers of one hand into your vagina. The other hand goes on your lower belly. This lets your doctor feel your pelvic organs. You will probably feel some pressure. Try to stay relaxed. ¨ Put one gloved finger into your rectum and one into your vagina, if needed. This can also help check your pelvic organs. This exam takes about 10 minutes. At the end, you will get a washcloth or tissue to clean your vaginal area. It's normal to have some discharge after this exam. You can then get dressed. Some test results may be ready right away. But results from a culture or a Pap test may take several days or a few weeks. Why should you have a pelvic exam?  · You want to have recommended screening tests. This includes a Pap test.  · You think you have a vaginal infection. Signs include itching, burning, or unusual discharge. · You might have been exposed to a sexually transmitted infection (STI), such as chlamydia or herpes. · You have vaginal bleeding that is not part of your normal menstrual period. · You have pain in your belly or pelvis. · You have been sexually assaulted. A pelvic exam lets your doctor collect evidence and check for STIs. · You are pregnant. · You are having trouble getting pregnant. What are the risks of a pelvic exam?  There are no risks from a pelvic exam.  When should you call for help?   Watch closely for changes in your health, and be sure to contact your doctor if:  · You have heavy bleeding or discharge from your vagina after the exam.  Where can you learn more? Go to http://aurea-matheus.info/. Enter E784 in the search box to learn more about \"Pelvic Exam: Care Instructions. \"  Current as of: October 13, 2016  Content Version: 11.3  © 7567-8950 [employer]. Care instructions adapted under license by [employer] (which disclaims liability or warranty for this information). If you have questions about a medical condition or this instruction, always ask your healthcare professional. [place] [address] any warranty or liability for your use of this information.

## 2017-06-26 NOTE — PROGRESS NOTES
[facility]  OFFICE PROCEDURE PROGRESS NOTE        Chart reviewed for the following:   Danelle GEORGE, have reviewed the History, Physical and updated the Allergic reactions for [address] performed immediately prior to start of procedure:   Danelle GEORGE, have performed the following reviews on Claudette Liz prior to the start of the procedure:            * Patient was identified by name and date of birth   * Agreement on procedure being performed was verified  * Risks and Benefits explained to the patient  * Procedure site verified and marked as necessary  * Patient was positioned for comfort  * Consent was signed and verified     Time: 11:28am      Date of procedure: 2017    Procedure performed by:  Pipe Mireles MD    Patient assisted by: self    How tolerated by patient: tolerated the procedure well with no complications    Post Procedural Pain Scale: 0 - No Hurt    Comments: none    -----------------------------------IUD REMOVAL---------------------  Indications for Removal:  Claudette Liz is a ,  32 y.o. female [facility] whose No LMP recorded. who presents today for IUD removal. Her current IUD was placed 17. She has had problems with the IUD. She requests removal of the IUD because c/o carpal tunnel flare. Pt's mother had during pregnancy, was told d/t progesterone levels. Has read on the internet that other pt's have also reported flares of carpal tunnel after IUD placed. Had not had any problems in 2yrs, flares were minor. After IUD placed had severe flare, has gotten cortisone shot, sx not resolved. The IUD removal procedure was discussed with the patient and she had no further questions. Procedure: The patient was placed in a dorsal lithotomy position. A speculum exam was performed and the cervix was visualized. The cervix was prepped with zephiran solution. The IUD string was visualized.  Using ring forceps , the string was grasped and the IUD removed intact. The IUD was shown to the patient.

## 2017-06-27 DIAGNOSIS — R20.0 NUMBNESS IN BOTH HANDS: Primary | ICD-10-CM

## 2017-06-28 ENCOUNTER — TELEPHONE (OUTPATIENT)
Dept: FAMILY MEDICINE CLINIC | Age: 27
End: 2017-06-28

## 2017-06-28 NOTE — TELEPHONE ENCOUNTER
Was given referral information by nurse Ailyn Jeffrey and informed the patients appointment was 06/28/17 @ 8:40am with Dr. Williams Miranda at [facility]     I spoke to the insurance company and was informed this patient does not require a referral. I faxed the referral order to [facility] for her appointment.

## 2018-02-12 ENCOUNTER — OFFICE VISIT (OUTPATIENT)
Dept: FAMILY MEDICINE CLINIC | Age: 28
End: 2018-02-12

## 2018-02-12 VITALS
DIASTOLIC BLOOD PRESSURE: 84 MMHG | SYSTOLIC BLOOD PRESSURE: 125 MMHG | OXYGEN SATURATION: 99 % | BODY MASS INDEX: 35.84 KG/M2 | HEART RATE: 73 BPM | TEMPERATURE: 97.7 F | WEIGHT: 223 LBS | RESPIRATION RATE: 16 BRPM | HEIGHT: 66 IN

## 2018-02-12 DIAGNOSIS — J02.9 SORE THROAT: Primary | ICD-10-CM

## 2018-02-12 DIAGNOSIS — J06.9 URI, ACUTE: ICD-10-CM

## 2018-02-12 LAB
S PYO AG THROAT QL: NEGATIVE
VALID INTERNAL CONTROL?: YES

## 2018-02-12 NOTE — LETTER
NOTIFICATION RETURN TO WORK / SCHOOL 
 
2/12/2018 11:22 AM 
 
Ms. Vilma Kiran [address] [facility] [address] To Whom It May Concern: 
 
Vilma Kiran is currently under the care of [facility]. She will return to work/school on: when she is  afebrile which may be up to 5 days. If there are questions or concerns please have the patient contact our office.  
 
 
 
Sincerely, 
 
 
Malena Lal MD

## 2018-02-12 NOTE — MR AVS SNAPSHOT
[address] 
[phone] Patient: Antonio Johnson MRN: [id number] UEM:0/5/7397 Visit Information Date & Time Provider Department Dept. Phone Encounter #  
 2/12/2018 10:45 AM Vickie Bonilla MD 9[address] [facility] [phone] [id number] Your Appointments 5/7/2018 10:00 AM  
ESTABLISHED PATIENT with Meagan Scott MD  
[facility] ([address] [place] [address]) Appt Note: annual exam DM  
 [address] [address]  
[address] Upcoming Health Maintenance Date Due Influenza Age 5 to Adult 8/1/2017 PAP AKA CERVICAL CYTOLOGY 5/3/2020 DTaP/Tdap/Td series (2 - Td) 2/19/2023 Allergies as of 2/12/2018  Review Complete On: 2/12/2018 By: June Rincon Severity Noted Reaction Type Reactions Pcn [Penicillins]  02/19/2013    Rash Current Immunizations  Never Reviewed Name Date Tdap 2/19/2013  4:23 PM  
  
 Not reviewed this visit You Were Diagnosed With   
  
 Codes Comments Sore throat    -  Primary ICD-10-CM: J02.9 ICD-9-CM: 757 Vitals BP Pulse Temp Resp Height(growth percentile) Weight(growth percentile) 125/84 (BP 1 Location: Left arm, BP Patient Position: Sitting) 73 97.7 °F (36.5 °C) (Oral) 16 5' 6\" (1.676 m) 223 lb (101.2 kg) SpO2 BMI OB Status Smoking Status 99% 35.99 kg/m2 Having regular periods Former Smoker Vitals History BMI and BSA Data Body Mass Index Body Surface Area 35.99 kg/m 2 2.17 m 2 Preferred Pharmacy Pharmacy Name Phone [facility] [address]  [facility] [phone] Your Updated Medication List  
  
   
This list is accurate as of: 2/12/18  4:40 PM.  Always use your most recent med list.  
  
  
  
 ADDERALL 30 mg tablet Generic drug:  dextroamphetamine-amphetamine Take 30 mg by mouth.  
  
 escitalopram oxalate 20 mg tablet Commonly known as:  Kerry Duos Take 1 Tab by mouth daily. NASACORT 55 mcg nasal inhaler Generic drug:  triamcinolone 2 sprays daily. OXcarbazepine 300 mg tablet Commonly known as:  TRILEPTAL TK 1 T PO HS  
  
 TL FOLATE PO Take  by mouth. ZyrTEC 10 mg tablet Generic drug:  cetirizine Take 10 mg by mouth daily. We Performed the Following AMB POC RAPID STREP A [82602 CPT(R)] Introducing [facility] & [facility]! Dear Manuel Nava: 
Thank you for requesting a [facility] account. Our records indicate that you already have an active [facility] account. You can access your account anytime at [email or web address] Did you know that you can access your hospital and ER discharge instructions at any time in [facility]? You can also review all of your test results from your hospital stay or ER visit. Additional Information If you have questions, please visit the Frequently Asked Questions section of the [employer] [facility] website at [email or web address] Remember, [employer] [facility] is NOT to be used for urgent needs. For medical emergencies, dial 911. Now available from your iPhone and Android! Please provide this summary of care documentation to your next provider. Your primary care clinician is listed as NONE. If you have any questions after today's visit, please call [phone].

## 2018-02-13 NOTE — PROGRESS NOTES
Erick Dai is a 25 y.o. female who presents for cough, congestion, and sore throat for 2 days. Reports low grade fever of 100 - 100.9 at home yesterday that resolved with tylenol. No sob/cp. Daughter diagnosed with strep 1 week ago. Meds:   Current Outpatient Prescriptions   Medication Sig Dispense Refill    OXcarbazepine (TRILEPTAL) 300 mg tablet TK 1 T PO HS  0    dextroamphetamine-amphetamine (ADDERALL) 30 mg tablet Take 30 mg by mouth.  escitalopram oxalate (LEXAPRO) 20 mg tablet Take 1 Tab by mouth daily. 90 Tab 1    PRENATAL NO.82/IRON/FOLATE #2 (TL FOLATE PO) Take  by mouth.  cetirizine (ZYRTEC) 10 mg tablet Take 10 mg by mouth daily.  triamcinolone (NASACORT) 55 mcg nasal inhaler 2 sprays daily. Allergies: Allergies   Allergen Reactions    Pcn [Penicillins] Rash       Smoker:  History   Smoking Status    Former Smoker    Packs/day: 0.50    Years: 7.00    Types: Cigarettes   Smokeless Tobacco    Never Used     Comment: Never used vapor or e-cigs        ETOH:   History   Alcohol Use    0.0 oz/week    0 Glasses of wine, 0 Cans of beer per week     Comment: Occasionally. FH:   Family History   Problem Relation Age of Onset    Hypertension Mother     Anxiety Mother     Thyroid Disease Mother     Diabetes Mother      Pre- DM per pt. Metaline.Manifold Other Mother      Per pt. mother h/o HPV and had Hysterectomy    Bipolar Disorder Father     Bipolar Disorder Sister        ROS:  Per HPI    Physical Exam:  Visit Vitals    /84 (BP 1 Location: Left arm, BP Patient Position: Sitting)    Pulse 73    Temp 97.7 °F (36.5 °C) (Oral)    Resp 16    Ht 5' 6\" (1.676 m)    Wt 223 lb (101.2 kg)    SpO2 99%    BMI 35.99 kg/m2     General: Alert and oriented, in no acute distress. Responds to all questions appropriately. SKIN: No rash. Normal color. HEAD: No sinus tenderness. EYES: Conjunctiva are clear; pupils round and reactive to light.   EARS: External normal, canals clear, tympanic membranes normal.  NOSE: Edema, erythema, clear mucous drainage. OROPHARYNX: Slight tonsil edema, erythema, no exudate. NECK: Supple; no masses; normal lymphadenopathy. LUNGS: Respirations unlabored; clear to auscultation bilaterally, no wheeze, rales or rhonchi. CARDIOVASCULAR: Regular, rate, and rhythm without murmurs, gallops or rubs. EXTREMITIES: No edema, cyanosis or clubbing. NEUROLOGIC: Speech intact; face symmetrical; moves all extremities equally    Lab: Rapid strep negative    Assessment:    ICD-10-CM ICD-9-CM    1. Sore throat J02.9 462 AMB POC RAPID STREP A   2. URI, acute J06.9 465.9    Likely viral URI    Plan:  Discharge instructions:  1. Combination cough and cold medicine such as Mucinex DM  2. Salt water gargle. 3. Plenty of fluids. 4. Ibuprofen (Motrin, Advil):  200mg - take 1-4 tables three times as needed for pain and fever   5. Acetaminophen (Tylenol):  500mg 1-2 tablets every 6 hours as needed for pain and fever. 6. Throat lozenges such as Halls as needed. 7. Humidifier as needed. Follow Up:  Get re-examined if not improved in  5-7 days or if symptoms worsen.      If you get suddenly worse, go to the nearest hospital Emergency Room

## 2018-02-16 ENCOUNTER — OFFICE VISIT (OUTPATIENT)
Dept: FAMILY MEDICINE CLINIC | Age: 28
End: 2018-02-16

## 2018-02-16 VITALS
TEMPERATURE: 98.2 F | RESPIRATION RATE: 18 BRPM | SYSTOLIC BLOOD PRESSURE: 108 MMHG | BODY MASS INDEX: 31.88 KG/M2 | DIASTOLIC BLOOD PRESSURE: 70 MMHG | OXYGEN SATURATION: 99 % | HEART RATE: 78 BPM | HEIGHT: 66 IN | WEIGHT: 198.4 LBS

## 2018-02-16 DIAGNOSIS — R05.8 UPPER AIRWAY COUGH SYNDROME: Primary | ICD-10-CM

## 2018-02-16 DIAGNOSIS — R09.81 CONGESTION OF NASAL SINUS: ICD-10-CM

## 2018-02-16 DIAGNOSIS — R05.9 COUGH: ICD-10-CM

## 2018-02-16 DIAGNOSIS — R68.89 FLU-LIKE SYMPTOMS: ICD-10-CM

## 2018-02-16 LAB
FLUAV+FLUBV AG NOSE QL IA.RAPID: NEGATIVE POS/NEG
FLUAV+FLUBV AG NOSE QL IA.RAPID: NEGATIVE POS/NEG
VALID INTERNAL CONTROL?: YES

## 2018-02-16 RX ORDER — BENZONATATE 100 MG/1
100 CAPSULE ORAL
Qty: 21 CAP | Refills: 0 | Status: SHIPPED | OUTPATIENT
Start: 2018-02-16 | End: 2018-02-23

## 2018-02-16 NOTE — PROGRESS NOTES
History of Present Illness:     Chief Complaint   Patient presents with    Cough     x 4 days    Breathing Problem     Pt is a 25y.o. year old female    Presents to clinic for cough x 4 days. Dry, barking, constant cough. Tried Delsum and it made her feel weird and anxious. Has the sensation that she wants to cough but cant. She feel wheezy and short of breath. She is worried that she has been exposed to the flu and strep with her daughter. Using Motrin and Tylenol and Mucinex. No hx of asthma. She has had bronchitis in the past.      No fever, chills, body aches today  +Cough, SOB  +Congestion     Past Medical History:   Diagnosis Date    Abnormal Pap smear of cervix 1/25/16    ASCUS ; HPV +    Abnormal Pap smear of cervix 05/03/2017    LGSIL, although HPV is negative -> needs colpo (Mick'd 5/31/17)     Anxiety associated with depression     Carpal tunnel syndrome     Encounter for IUD removal 05/17/2017    Inserted 5/17/17 -> removed 6/26/17 for c/o carpal tunnel flare    HSV-1 infection 1/2016    serum screening    Low iron     Reports not being anemic. Last labs WNL in 2013 per pt. Current Outpatient Prescriptions on File Prior to Visit   Medication Sig Dispense Refill    OXcarbazepine (TRILEPTAL) 300 mg tablet TK 1 T PO HS  0    dextroamphetamine-amphetamine (ADDERALL) 30 mg tablet Take 30 mg by mouth.  PRENATAL NO.82/IRON/FOLATE #2 (TL FOLATE PO) Take  by mouth.  cetirizine (ZYRTEC) 10 mg tablet Take 10 mg by mouth daily.  triamcinolone (NASACORT) 55 mcg nasal inhaler 2 sprays daily.  escitalopram oxalate (LEXAPRO) 20 mg tablet Take 1 Tab by mouth daily. 90 Tab 1     No current facility-administered medications on file prior to visit. Allergies:   Allergies   Allergen Reactions    Pcn [Penicillins] Rash           Objective:     Vitals:    02/16/18 1007   BP: 108/70   Pulse: 78   Resp: 18   Temp: 98.2 °F (36.8 °C)   TempSrc: Oral   SpO2: 99%   Weight: 198 lb 6.4 oz (90 kg)   Height: 5' 6\" (1.676 m)       Physical Exam:  General appearance - alert, well appearing, and in no distress  Ears - bilateral TM's and external ear canals normal  Nose - mucosal congestion and mucosal erythema  Mouth - mucous membranes moist, pharynx normal without lesions  Neck - supple, no significant adenopathy  Chest - clear to auscultation, no wheezes, rales or rhonchi, symmetric air entry  Heart - normal rate, regular rhythm, normal S1, S2, no murmurs, rubs, clicks or gallops      Recent Labs:  Recent Results (from the past 12 hour(s))   AMB POC AQUILES INFLUENZA A/B TEST    Collection Time: 02/16/18 10:40 AM   Result Value Ref Range    VALID INTERNAL CONTROL POC Yes     Influenza A Ag POC Negative Negative Pos/Neg    Influenza B Ag POC Negative Negative Pos/Neg         Assessment and Plan:   Pt is a 25y.o. year old female,      ICD-10-CM ICD-9-CM    1. Upper airway cough syndrome R05 786.2    2. Flu-like symptoms R68.89 780.99 AMB POC AQUILES INFLUENZA A/B TEST   3. Cough R05 786.2 benzonatate (TESSALON) 100 mg capsule   4. Congestion of nasal sinus R09.81 478.19      Continue supportive care for cough  Recommended nasal steroid - Taking Nasonex  Trial Tessalon PRN    Follow up PRN    Cm Martinez MD      I have discussed the diagnosis with the patient and the intended plan as seen in the above orders. The patient has received an after-visit summary and questions were answered concerning future plans.

## 2018-02-16 NOTE — PATIENT INSTRUCTIONS

## 2018-02-16 NOTE — MR AVS SNAPSHOT
[address] 
[phone] Patient: Brenda Willams MRN: [id number] CFM:[birth year]891 Visit Information Date & Time Provider Department Dept. Phone Encounter #  
 2/16/2018  9:30 AM Jerson Wojciech, 1000 [facility] [phone] [id number] Follow-up Instructions Return if symptoms worsen or fail to improve. Your Appointments 5/7/2018 10:00 AM  
ESTABLISHED PATIENT with [address], MD  
[place] ([address] [facility]) Appt Note: annual exam DM  
 [address] [facility] [address] [facility] [address]  
[facility] 31 [address] Upcoming Health Maintenance Date Due Influenza Age 5 to Adult 8/1/2017 PAP AKA CERVICAL CYTOLOGY 5/3/2020 DTaP/Tdap/Td series (2 - Td) 2/19/2023 Allergies as of 2/16/2018  Review Complete On: 2/16/2018 By: Jerson Jeffrey MD  
  
 Severity Noted Reaction Type Reactions Pcn [Penicillins]  02/19/2013    Rash Current Immunizations  Never Reviewed Name Date Tdap 2/19/2013  4:23 PM  
  
 Not reviewed this visit You Were Diagnosed With   
  
 Codes Comments Upper airway cough syndrome    -  Primary ICD-10-CM: A30 ICD-9-CM: 786.2 Flu-like symptoms     ICD-10-CM: R68.89 ICD-9-CM: 780.99 Cough     ICD-10-CM: R05 ICD-9-CM: 786.2 Congestion of nasal sinus     ICD-10-CM: R09.81 ICD-9-CM: 478.19 Vitals BP Pulse Temp Resp Height(growth percentile) Weight(growth percentile) 108/70 78 98.2 °F (36.8 °C) (Oral) 18 5' 6\" (1.676 m) 198 lb 6.4 oz (90 kg) LMP SpO2 BMI OB Status Smoking Status 02/11/2018 (Exact Date) 99% 32.02 kg/m2 Having regular periods Former Smoker Vitals History BMI and BSA Data Body Mass Index Body Surface Area 32.02 kg/m 2 2.05 m 2 Preferred Pharmacy Pharmacy Name Phone [facility] [address]  [facility] [phone] Your Updated Medication List  
  
   
This list is accurate as of: 2/16/18 10:48 AM.  Always use your most recent med list.  
  
  
  
  
 ADDERALL 30 mg tablet Generic drug:  dextroamphetamine-amphetamine Take 30 mg by mouth.  
  
 benzonatate 100 mg capsule Commonly known as:  TESSALON Take 1 Cap by mouth three (3) times daily as needed for Cough for up to 7 days. escitalopram oxalate 20 mg tablet Commonly known as:  Belen Friendly Take 1 Tab by mouth daily. NASACORT 55 mcg nasal inhaler Generic drug:  triamcinolone 2 sprays daily. OXcarbazepine 300 mg tablet Commonly known as:  TRILEPTAL TK 1 T PO HS  
  
 TL FOLATE PO Take  by mouth. ZyrTEC 10 mg tablet Generic drug:  cetirizine Take 10 mg by mouth daily. Prescriptions Sent to Pharmacy Refills  
 benzonatate (TESSALON) 100 mg capsule 0 Sig: Take 1 Cap by mouth three (3) times daily as needed for Cough for up to 7 days. Class: Normal  
 Pharmacy: [facility]  [address] [place] - [address] AT [facility] of [address] ([facility] Ph #: [phone] Route: Oral  
  
We Performed the Following AMB POC AQUILES INFLUENZA A/B TEST [06174 CPT(R)] Follow-up Instructions Return if symptoms worsen or fail to improve. Patient Instructions Cough: Care Instructions Your Care Instructions A cough is your body's response to something that bothers your throat or airways. Many things can cause a cough. You might cough because of a cold or the flu, bronchitis, or asthma. Smoking, postnasal drip, allergies, and stomach acid that backs up into your throat also can cause coughs. A cough is a symptom, not a disease. Most coughs stop when the cause, such as a cold, goes away.  You can take a few steps at home to cough less and feel better. Follow-up care is a key part of your treatment and safety. Be sure to make and go to all appointments, and call your doctor if you are having problems. It's also a good idea to know your test results and keep a list of the medicines you take. How can you care for yourself at home? · Drink lots of water and other fluids. This helps thin the mucus and soothes a dry or sore throat. Honey or lemon juice in hot water or tea may ease a dry cough. · Take cough medicine as directed by your doctor. · Prop up your head on pillows to help you breathe and ease a dry cough. · Try cough drops to soothe a dry or sore throat. Cough drops don't stop a cough. Medicine-flavored cough drops are no better than candy-flavored drops or hard candy. · Do not smoke. Avoid secondhand smoke. If you need help quitting, talk to your doctor about stop-smoking programs and medicines. These can increase your chances of quitting for good. When should you call for help? Call 911 anytime you think you may need emergency care. For example, call if: 
? · You have severe trouble breathing. ?Call your doctor now or seek immediate medical care if: 
? · You cough up blood. ? · You have new or worse trouble breathing. ? · You have a new or higher fever. ? · You have a new rash. ? Watch closely for changes in your health, and be sure to contact your doctor if: 
? · You cough more deeply or more often, especially if you notice more mucus or a change in the color of your mucus. ? · You have new symptoms, such as a sore throat, an earache, or sinus pain. ? · You do not get better as expected. Where can you learn more? Go to http://aurea-matheus.info/. Enter D279 in the search box to learn more about \"Cough: Care Instructions. \" Current as of: May 12, 2017 Content Version: 11.4 © 8280-9835 Healthwise, Incorporated.  Care instructions adapted under license by [address] (which disclaims liability or warranty for this information). If you have questions about a medical condition or this instruction, always ask your healthcare professional. [address] any warranty or liability for your use of this information. Introducing [facility] & [facility]! Dear Miriam Harkins: 
Thank you for requesting a [employer] account. Our records indicate that you already have an active [employer] account. You can access your account anytime at [email or web address] Did you know that you can access your hospital and ER discharge instructions at any time in [employer]? You can also review all of your test results from your hospital stay or ER visit. Additional Information If you have questions, please visit the Frequently Asked Questions section of the [employer] website at [email or web address] Remember, [employer] is NOT to be used for urgent needs. For medical emergencies, dial 911. Now available from your iPhone and Android! Please provide this summary of care documentation to your next provider. Your primary care clinician is listed as Giovani Najera. If you have any questions after today's visit, please call [phone].

## 2018-02-16 NOTE — PROGRESS NOTES
Chief Complaint   Patient presents with    Cough     x 4 days    Breathing Problem     1. Have you been to the ER, urgent care clinic since your last visit? Hospitalized since your last visit? No    2. Have you seen or consulted any other health care providers outside of the [address] [place] [address] since your last visit? Include any pap smears or colon screening. No    Patient states she had fevers and chills 2 days ago but those symptoms are no longer there.

## 2018-05-07 ENCOUNTER — OFFICE VISIT (OUTPATIENT)
Dept: OBGYN CLINIC | Age: 28
End: 2018-05-07

## 2018-05-07 VITALS
BODY MASS INDEX: 32.47 KG/M2 | WEIGHT: 202 LBS | SYSTOLIC BLOOD PRESSURE: 119 MMHG | HEART RATE: 87 BPM | DIASTOLIC BLOOD PRESSURE: 81 MMHG | HEIGHT: 66 IN

## 2018-05-07 DIAGNOSIS — Z01.419 ENCOUNTER FOR WELL WOMAN EXAM WITH ROUTINE GYNECOLOGICAL EXAM: Primary | ICD-10-CM

## 2018-05-07 DIAGNOSIS — R87.612 LGSIL ON PAP SMEAR OF CERVIX: ICD-10-CM

## 2018-05-07 DIAGNOSIS — Z11.3 SCREENING FOR VENEREAL DISEASE: ICD-10-CM

## 2018-05-07 NOTE — PROGRESS NOTES
Annual exam ages 21-44    Rossy Arias is a ,  25 y.o. female [facility] Patient's last menstrual period was 2018 (exact date). , who presents for her annual checkup. Since her last annual GYN exam about one year ago, she has had the following changes in her health history:   - had Mirena placed 2017 -> removed 1 month later d/t carpal tunnel flare  (pt's mom had similar sx during her pregnancy, was told d/t progesterone). Sx improved after removal.  - Bilateral carpel tunnel surgery in summer 2017        ADDITIONAL CONCERNS:  She is having no significant problems. Desires STD testing with blood work. HSV-1 seropositive    With regard to the Gardasil vaccine, she is older than the FDA approved age to receive it. Menstrual status:    Her periods are light, moderate in flow. She is using three to five pads or tampons per day, usually regular and last 26-30 days. She denies dysmenorrhea. She has premenstrual symptoms. Takes an additional 10mg of Lexapro 10 days prior to menses. Contraception:    The current method of family planning is condoms always and abstinence. Sexual history:     She  reports that she does not currently engage in sexual activity but has had male partners.; She reports using the following method of birth control/protection: Condom. .        Pap and Mammogram History:    Pap (12): ASCUS +HPV  colpo (10/21/14): ECC - benign endocervix, detatched frag of sq mucosa sugg of HPV effect; 4:00 - HPV effect, focal atypia favor reactive; 11:00 - benign.   Pap (16) ASCUS +HPV  colpo (3/14/16): ECC negative; cvx bx @6:00=koilocytosis/HPV effect  Pap (5/3/17) LGSIL, HPV neg  colpo (17) ECC neg; bx @ = AP I -> pap/HPV 1yr    The patient has never had a mammogram.    Breast Cancer History/Substance Abuse: Negative    Past Medical History:   Diagnosis Date    Abnormal Pap smear of cervix 16    ASCUS ; HPV +    Abnormal Pap smear of cervix 2017    LGSIL, although HPV is negative -> needs colpo (Mick'd 17)     Anxiety associated with depression     Carpal tunnel syndrome     Encounter for IUD removal 2017    Inserted 17 -> removed 17 for c/o carpal tunnel flare    HSV-1 infection 2016    serum screening    Low iron     Reports not being anemic. Last labs WNL in  per pt. Past Surgical History:   Procedure Laterality Date    HX CARPAL TUNNEL RELEASE Bilateral 2017    Summer    HX COLPOSCOPY  10/21/14    ECC - benign endocervix, detatched frag of sq mucosa sugg of HPV effect; 4:00 - HPV effect, focal atypia favor reactive; 11:00 - benign.  HX TONSILLECTOMY      HX WISDOM TEETH EXTRACTION       OB History    Para Term  AB Living   1 1 1   1   SAB TAB Ectopic Molar Multiple Live Births        1      # Outcome Date GA Lbr Charles/2nd Weight Sex Delivery Anes PTL Lv   1 Term 09 40w0d 32:00 7 lb 8 oz (3.402 kg) F VAGINAL DELI EPI N OG      Birth Comments: [place]          Current Outpatient Prescriptions   Medication Sig Dispense Refill    OXcarbazepine (TRILEPTAL) 300 mg tablet TK 1 T PO HS  0    dextroamphetamine-amphetamine (ADDERALL) 30 mg tablet Take 30 mg by mouth.  escitalopram oxalate (LEXAPRO) 20 mg tablet Take 1 Tab by mouth daily. 90 Tab 1    PRENATAL NO.82/IRON/FOLATE #2 (TL FOLATE PO) Take  by mouth.  cetirizine (ZYRTEC) 10 mg tablet Take 10 mg by mouth daily.  triamcinolone (NASACORT) 55 mcg nasal inhaler 2 sprays daily. Allergies: Pcn [penicillins]   Social History     Social History    Marital status: SINGLE     Spouse name: N/A    Number of children: N/A    Years of education: N/A     Occupational History    Not on file.      Social History Main Topics    Smoking status: Former Smoker     Packs/day: 0.50     Years: 7.00     Types: Cigarettes    Smokeless tobacco: Never Used      Comment: Never used vapor or e-cigs     Alcohol use 0.0 oz/week     0 Glasses of wine, 0 Cans of beer per week      Comment: Occasionally.  Drug use: No      Comment: Has used amphetamines 1 yr ago, but none presently.  Sexual activity: Not Currently     Partners: Male     Birth control/ protection: Condom     Other Topics Concern    Not on file     Social History Narrative     Tobacco History:  reports that she has quit smoking. Her smoking use included Cigarettes. She has a 3.50 pack-year smoking history. She has never used smokeless tobacco.  Alcohol Abuse:  reports that she drinks alcohol. Drug Abuse:  reports that she does not use illicit drugs. Patient Active Problem List   Diagnosis Code    Anxiety associated with depression F41.8    Abnormal pap [id number]     Family History   Problem Relation Age of Onset    Hypertension Mother    Parul Britton Anxiety Mother     Thyroid Disease Mother     Diabetes Mother      Pre- DM per pt.    Parul Britton Other Mother      Per pt. mother h/o HPV and had Hysterectomy    Bipolar Disorder Father     Bipolar Disorder Sister        Review of Systems - History obtained from the patient  Constitutional: negative for weight loss, fever, night sweats  HEENT: negative for hearing loss, earache, congestion, snoring, sorethroat  CV: negative for chest pain, palpitations, edema  Resp: negative for cough, shortness of breath, wheezing  GI: negative for change in bowel habits, abdominal pain, black or bloody stools  : negative for frequency, dysuria, hematuria, vaginal discharge  MSK: negative for back pain, joint pain, muscle pain  Breast: negative for breast lumps, nipple discharge, galactorrhea  Skin :negative for itching, rash, hives  Neuro: negative for dizziness, headache, confusion, weakness  Psych: negative for anxiety, depression, change in mood  Heme/lymph: negative for bleeding, bruising, pallor    Physical Exam    Visit Vitals    /81    Pulse 87    Ht 5' 6\" (1.676 m)    Wt 202 lb (91.6 kg)    LMP 04/12/2018 (Exact Date)    BMI 32.6 kg/m2 Constitutional  · Appearance: well-nourished, well developed, alert, in no acute distress    HENT  · Head and Face: appears normal    Neck  · Inspection/Palpation: normal appearance, no masses or tenderness  · Lymph Nodes: no lymphadenopathy present  · Thyroid: gland size normal, nontender, no nodules or masses present on palpation    Chest  · Respiratory Effort: breathing unlabored  · Auscultation: normal breath sounds    Cardiovascular  · Heart:  · Auscultation: regular rate and rhythm without murmur    Breasts  · Inspection of Breasts: breasts symmetrical, no skin changes, no discharge present, nipple appearance normal, no skin retraction present  · Palpation of Breasts and Axillae: no masses present on palpation, no breast tenderness  · Axillary Lymph Nodes: no lymphadenopathy present    Gastrointestinal  · Abdominal Examination: abdomen non-tender to palpation, normal bowel sounds, no masses present  · Liver and spleen: no hepatomegaly present, spleen not palpable  · Hernias: no hernias identified    Genitourinary  · External Genitalia: normal appearance for age, no discharge present, no tenderness present, no inflammatory lesions present, no masses present, no atrophy present  · Vagina: normal vaginal vault without central or paravaginal defects, no discharge present, no inflammatory lesions present, no masses present  · Bladder: non-tender to palpation  · Urethra: appears normal  · Cervix: normal   · Uterus: normal size, shape and consistency  · Adnexa: no adnexal tenderness present, no adnexal masses present  · Perineum: perineum within normal limits, no evidence of trauma, no rashes or skin lesions present  · Anus: anus within normal limits, no hemorrhoids present  · Inguinal Lymph Nodes: no lymphadenopathy present    Skin  · General Inspection: no rash, no lesions identified    Neurologic/Psychiatric  · Mental Status:  · Orientation: grossly oriented to person, place and time  · Mood and Affect: mood normal, affect appropriate        Assessment & Plan:  · Routine gynecologic examination. Pap/HPV  · LGSIL pap with AP 1 on bx last year. Pap/HPV as above. · Requests STD testing. Nuswab GCT. Will draw HIV, T.pallidum, HBsAg, HepC, HSV-2. Handout given. · Her current medical status is satisfactory with no evidence of significant gynecologic issues. · Counseled re: diet, exercise, healthy lifestyle  · Return for yearly wellness visits  · Patient verbalized understanding    Orders Placed This Encounter    CT/NG/T.VAGINALIS AMPLIFICATION     Order Specific Question:   Specimen type     Answer:   Vaginal [516]    HEP B SURFACE AG    HEPATITIS C AB    HIV 1/2 AG/AB, 4TH GENERATION,W RFLX CONFIRM    T PALLIDUM SCREEN W/REFLEX    HSV-2 AB, IGG GLYCOPROTEIN, G-SPECIFIC    PAP IG, HPV AND RFX HPV 63/37,58([id number])     Scheduling Instructions:      Pap (9/30/14): ASCUS +HPV      colpo (10/21/14): ECC - benign endocervix, detatched frag of sq mucosa sugg of HPV effect; 4:00 - HPV effect, focal atypia favor reactive; 11:00 - benign. Pap (1/25/16) ASCUS +HPV      colpo (3/14/16): ECC negative; cvx bx @6:00=koilocytosis/HPV effect      Pap (5/3/17) LGSIL, HPV neg      colpo (5/31/17) ECC neg; bx @ 6/12= AP I -> pap/HPV 1yr     Order Specific Question:   Pap Source? Answer:   Cervical and Endocervical     Order Specific Question:   Total Hysterectomy? Answer:   No     Order Specific Question:   Supracervical Hysterectomy? Answer:   No     Order Specific Question:   Post Menopausal?     Answer:   No     Order Specific Question:   Hormone Therapy? Answer:   No     Order Specific Question:   IUD? Answer:   No     Order Specific Question:   Abnormal Bleeding? Answer:   No     Order Specific Question:   Pregnant     Answer:   No     Order Specific Question:   Post Partum? Answer:    No

## 2018-05-07 NOTE — PATIENT INSTRUCTIONS
Well Visit, Ages [address] [facility] 9 to 48: Care Instructions  Your Care Instructions    Physical exams can help you stay healthy. Your doctor has checked your overall health and may have suggested ways to take good care of yourself. He or she also may have recommended tests. At home, you can help prevent illness with healthy eating, regular exercise, and other steps. Follow-up care is a key part of your treatment and safety. Be sure to make and go to all appointments, and call your doctor if you are having problems. It's also a good idea to know your test results and keep a list of the medicines you take. How can you care for yourself at home? · Reach and stay at a healthy weight. This will lower your risk for many problems, such as obesity, diabetes, heart disease, and high blood pressure. · Get at least 30 minutes of physical activity on most days of the week. Walking is a good choice. You also may want to do other activities, such as running, swimming, cycling, or playing tennis or team sports. Discuss any changes in your exercise program with your doctor. · Do not smoke or allow others to smoke around you. If you need help quitting, talk to your doctor about stop-smoking programs and medicines. These can increase your chances of quitting for good. · Talk to your doctor about whether you have any risk factors for sexually transmitted infections (STIs). Having one sex partner (who does not have STIs and does not have sex with anyone else) is a good way to avoid these infections. · Use birth control if you do not want to have children at this time. Talk with your doctor about the choices available and what might be best for you. · Protect your skin from too much sun. When you're outdoors from 10 a.m. to 4 p.m., stay in the shade or cover up with clothing and a hat with a wide brim. Wear sunglasses that block UV rays. Even when it's cloudy, put broad-spectrum sunscreen (SPF 30 or higher) on any exposed skin.   · See a dentist one or two times a year for checkups and to have your teeth cleaned. · Wear a seat belt in the car. · Drink alcohol in moderation, if at all. That means no more than 2 drinks a day for men and 1 drink a day for women. Follow your doctor's advice about when to have certain tests. These tests can spot problems early. For everyone  · Cholesterol. Have the fat (cholesterol) in your blood tested after age 21. Your doctor will tell you how often to have this done based on your age, family history, or other things that can increase your risk for heart disease. · Blood pressure. Have your blood pressure checked during a routine doctor visit. Your doctor will tell you how often to check your blood pressure based on your age, your blood pressure results, and other factors. · Vision. Talk with your doctor about how often to have a glaucoma test.  · Diabetes. Ask your doctor whether you should have tests for diabetes. · Colon cancer. Have a test for colon cancer at age 48. You may have one of several tests. If you are younger than 48, you may need a test earlier if you have any risk factors. Risk factors include whether you already had a precancerous polyp removed from your colon or whether your parent, brother, sister, or child has had colon cancer. For women  · Breast exam and mammogram. Talk to your doctor about when you should have a clinical breast exam and a mammogram. Medical experts differ on whether and how often women under 50 should have these tests. Your doctor can help you decide what is right for you. · Pap test and pelvic exam. Begin Pap tests at age 24. A Pap test is the best way to find cervical cancer. The test often is part of a pelvic exam. Ask how often to have this test.  · Tests for sexually transmitted infections (STIs). Ask whether you should have tests for STIs. You may be at risk if you have sex with more than one person, especially if your partners do not wear condoms.   For men  · Tests for sexually transmitted infections (STIs). Ask whether you should have tests for STIs. You may be at risk if you have sex with more than one person, especially if you do not wear a condom. · Testicular cancer exam. Ask your doctor whether you should check your testicles regularly. · Prostate exam. Talk to your doctor about whether you should have a blood test (called a PSA test) for prostate cancer. Experts differ on whether and when men should have this test. Some experts suggest it if you are older than 39 and are -American or have a father or brother who got prostate cancer when he was younger than 72. When should you call for help? Watch closely for changes in your health, and be sure to contact your doctor if you have any problems or symptoms that concern you. Where can you learn more? Go to http://aurea-matheus.info/. Enter P072 in the search box to learn more about \"Well Visit, Ages 25 to 48: Care Instructions. \"  Current as of: May 12, 2017  Content Version: 11.4  © 9217-8772 [employer]. Care instructions adapted under license by [employer] (which disclaims liability or warranty for this information). If you have questions about a medical condition or this instruction, always ask your healthcare professional. [address] any warranty or liability for your use of this information.

## 2018-05-09 LAB
C TRACH RRNA SPEC QL NAA+PROBE: NEGATIVE
HBV SURFACE AG SERPL QL IA: NEGATIVE
HCV AB S/CO SERPL IA: <0.1 S/CO RATIO (ref 0–0.9)
HIV 1+2 AB+HIV1 P24 AG SERPL QL IA: NON REACTIVE
HSV2 IGG SER IA-ACNC: <0.91 INDEX (ref 0–0.9)
N GONORRHOEA RRNA SPEC QL NAA+PROBE: NEGATIVE
T PALLIDUM AB SER QL IA: NEGATIVE
T VAGINALIS RRNA SPEC QL NAA+PROBE: NEGATIVE

## 2018-05-10 LAB
CYTOLOGIST CVX/VAG CYTO: NORMAL
CYTOLOGY CVX/VAG DOC THIN PREP: NORMAL
CYTOLOGY HISTORY:: NORMAL
DX ICD CODE: NORMAL
HPV I/H RISK 1 DNA CVX QL PROBE+SIG AMP: NEGATIVE
Lab: NORMAL
Lab: NORMAL
OTHER STN SPEC: NORMAL
PATH REPORT.FINAL DX SPEC: NORMAL
STAT OF ADQ CVX/VAG CYTO-IMP: NORMAL

## 2018-05-23 ENCOUNTER — OFFICE VISIT (OUTPATIENT)
Dept: FAMILY MEDICINE CLINIC | Age: 28
End: 2018-05-23

## 2018-05-23 VITALS
TEMPERATURE: 98.5 F | HEART RATE: 73 BPM | DIASTOLIC BLOOD PRESSURE: 66 MMHG | BODY MASS INDEX: 32.14 KG/M2 | HEIGHT: 66 IN | RESPIRATION RATE: 16 BRPM | OXYGEN SATURATION: 99 % | WEIGHT: 200 LBS | SYSTOLIC BLOOD PRESSURE: 106 MMHG

## 2018-05-23 DIAGNOSIS — H69.92 EUSTACHIAN TUBE DISORDER, LEFT: Primary | ICD-10-CM

## 2018-05-23 RX ORDER — NAPROXEN 250 MG/1
250 TABLET ORAL 2 TIMES DAILY WITH MEALS
Qty: 30 TAB | Refills: 1 | Status: SHIPPED | OUTPATIENT
Start: 2018-05-23 | End: 2018-06-25 | Stop reason: SDUPTHER

## 2018-05-23 RX ORDER — CYCLOBENZAPRINE HCL 10 MG
5 TABLET ORAL
Qty: 45 TAB | Refills: 0 | Status: SHIPPED | OUTPATIENT
Start: 2018-05-23 | End: 2018-05-23 | Stop reason: SINTOL

## 2018-05-23 NOTE — PATIENT INSTRUCTIONS
Seasonal Allergies: Care Instructions  Your Care Instructions  Allergies occur when your body's defense system (immune system) overreacts to certain substances. The immune system treats a harmless substance as if it were a harmful germ or virus. Many things can cause this to happen. Examples include pollens, medicine, food, dust, animal dander, and mold. Your allergies are seasonal if you have symptoms just at certain times of the year. In that case, you are probably allergic to pollens from certain trees, grasses, or weeds. Allergies can be mild or severe. Over-the-counter allergy medicine may help with some symptoms. Read and follow all instructions on the label. Managing your allergies is an important part of staying healthy. Your doctor may suggest that you have tests to help find the cause of your allergies. When you know what things trigger your symptoms, you can avoid them. This can prevent allergy symptoms and other health problems. In some cases, immunotherapy might help. For this treatment, you get shots or use pills that have a small amount of certain allergens in them. Your body \"gets used to\" the allergen, so you react less to it over time. This kind of treatment may help prevent or reduce some allergy symptoms. Follow-up care is a key part of your treatment and safety. Be sure to make and go to all appointments, and call your doctor if you are having problems. It's also a good idea to know your test results and keep a list of the medicines you take. How can you care for yourself at home? · Be safe with medicines. Take your medicines exactly as prescribed. Call your doctor if you think you are having a problem with your medicine. · During your allergy season, keep windows closed. If you need to use air-conditioning, change or clean all filters every month. Take a shower and change your clothes after you have been outside. · Stay inside when pollen counts are high.  Vacuum once or twice a week. Use a vacuum  with a HEPA filter or a double-thickness filter. When should you call for help? Give an epinephrine shot if:  ? · You think you are having a severe allergic reaction. ? After giving an epinephrine shot, call 911, even if you feel better. ?Call 911 if:  ? · You have symptoms of a severe allergic reaction. These may include:  ¨ Sudden raised, red areas (hives) all over your body. ¨ Swelling of the throat, mouth, lips, or tongue. ¨ Trouble breathing. ¨ Passing out (losing consciousness). Or you may feel very lightheaded or suddenly feel weak, confused, or restless. ? · You have been given an epinephrine shot, even if you feel better. ?Call your doctor now or seek immediate medical care if:  ? · You have symptoms of an allergic reaction, such as:  ¨ A rash or hives (raised, red areas on the skin). ¨ Itching. ¨ Swelling. ¨ Belly pain, nausea, or vomiting. ? Watch closely for changes in your health, and be sure to contact your doctor if:  ? · You do not get better as expected. Where can you learn more? Go to http://aurea-matheus.info/. Enter J912 in the search box to learn more about \"Seasonal Allergies: Care Instructions. \"  Current as of: September 29, 2016  Content Version: 11.4  © 4086-2260 [employer]. Care instructions adapted under license by [employer] (which disclaims liability or warranty for this information). If you have questions about a medical condition or this instruction, always ask your healthcare professional. [place] [address] any warranty or liability for your use of this information. Eustachian Tube Problems: Care Instructions  Your Care Instructions    The eustachian (say \"you-STAY-shee-un\") tubes run between the inside of the ears and the throat. They keep air pressure stable in the ears. If your eustachian tubes become blocked, the air pressure in your ears changes.  The fluids from a cold can clog eustachian tubes, causing pain in the ears. A quick change in air pressure can cause eustachian tubes to close up. This might happen when an airplane changes altitude or when a  goes up or down underwater. Eustachian tube problems often clear up on their own or after antibiotic treatment. If your tubes continue to be blocked, you may need surgery. Follow-up care is a key part of your treatment and safety. Be sure to make and go to all appointments, and call your doctor if you are having problems. It's also a good idea to know your test results and keep a list of the medicines you take. How can you care for yourself at home? · To ease ear pain, apply a warm washcloth or a heating pad set on low. There may be some drainage from the ear when the heat melts earwax. Put a cloth between the heat source and your skin. Do not use a heating pad with children. · If your doctor prescribed antibiotics, take them as directed. Do not stop taking them just because you feel better. You need to take the full course of antibiotics. · Your doctor may recommend over-the-counter medicine. Be safe with medicines. Oral or nasal decongestants may relieve ear pain. Avoid decongestants that are combined with antihistamines, which tend to cause more blockage. But if allergies seem to be the problem, your doctor may recommend a combination. Be careful with cough and cold medicines. Don't give them to children younger than 6, because they don't work for children that age and can even be harmful. For children 6 and older, always follow all the instructions carefully. Make sure you know how much medicine to give and how long to use it. And use the dosing device if one is included. When should you call for help? Call your doctor now or seek immediate medical care if:  ? · You develop sudden, complete hearing loss. ? · You have severe pain or feel dizzy.    ? · You have new or increasing pus or blood draining from your ear.   ? · You have redness, swelling, or pain around or behind the ear. ? Watch closely for changes in your health, and be sure to contact your doctor if:  ? · You do not get better after 2 weeks. ? · You have any new symptoms, such as itching or a feeling of fullness in the ear. Where can you learn more? Go to http://aurea-matheus.info/. Enter Y822 in the search box to learn more about \"Eustachian Tube Problems: Care Instructions. \"  Current as of: May 12, 2017  Content Version: 11.4  © 5235-6078 [employer]. Care instructions adapted under license by [employer] (which disclaims liability or warranty for this information). If you have questions about a medical condition or this instruction, always ask your healthcare professional. [address] any warranty or liability for your use of this information.

## 2018-05-23 NOTE — MR AVS SNAPSHOT
[address] 
[phone] Patient: Jimena Flood MRN: [id number] LFR:9/1/[birth year]99 Visit Information Date & Time Provider Department Dept. Phone Encounter #  
 5/23/2018  6:40 PM Juan Carlos Negro, Chris Pichardo [phone] [id number] Upcoming Health Maintenance Date Due Influenza Age 5 to Adult 8/1/2018 PAP AKA CERVICAL CYTOLOGY 5/7/2021 DTaP/Tdap/Td series (2 - Td) 2/19/2023 Allergies as of 5/23/2018  Review Complete On: 5/23/2018 By: Guero Boyle Severity Noted Reaction Type Reactions Pcn [Penicillins]  02/19/2013    Rash Current Immunizations  Never Reviewed Name Date Tdap 2/19/2013  4:23 PM  
  
 Not reviewed this visit You Were Diagnosed With   
  
 Codes Comments Eustachian tube disorder, left    -  Primary ICD-10-CM: H69.92 
ICD-9-CM: 381. 9 Vitals BP Pulse Temp Resp Height(growth percentile) Weight(growth percentile) 106/66 (BP 1 Location: Right arm, BP Patient Position: Sitting) 73 98.5 °F (36.9 °C) (Oral) 16 5' 6\" (1.676 m) 200 lb (90.7 kg) LMP SpO2 BMI OB Status Smoking Status 05/12/2018 (Approximate) 99% 32.28 kg/m2 Having regular periods Former Smoker Vitals History BMI and BSA Data Body Mass Index Body Surface Area  
 32.28 kg/m 2 2.06 m 2 Preferred Pharmacy Pharmacy Name Phone [facility] [address]  [facility] [place] [phone] Your Updated Medication List  
  
   
This list is accurate as of 5/23/18  7:38 PM.  Always use your most recent med list.  
  
  
  
  
 ADDERALL 30 mg tablet Generic drug:  dextroamphetamine-amphetamine Take 30 mg by mouth.  
  
 escitalopram oxalate 20 mg tablet Commonly known as:  Kati Bors Take 1 Tab by mouth daily. naproxen 250 mg tablet Commonly known as:  NAPROSYN  
 Take 1 Tab by mouth two (2) times daily (with meals). NASACORT 55 mcg nasal inhaler Generic drug:  triamcinolone 2 sprays daily. OXcarbazepine 300 mg tablet Commonly known as:  TRILEPTAL TK 1 T PO HS  
  
 TL FOLATE PO Take  by mouth. ZyrTEC 10 mg tablet Generic drug:  cetirizine Take 10 mg by mouth daily. Prescriptions Sent to Pharmacy Refills  
 naproxen (NAPROSYN) 250 mg tablet 1 Sig: Take 1 Tab by mouth two (2) times daily (with meals). Class: Normal  
 Pharmacy: [facility] [address] [place] [address] [address] AT [facility] [address] ([facility] Ph #: [phone] Route: Oral  
  
Patient Instructions Seasonal Allergies: Care Instructions Your Care Instructions Allergies occur when your body's defense system (immune system) overreacts to certain substances. The immune system treats a harmless substance as if it were a harmful germ or virus. Many things can cause this to happen. Examples include pollens, medicine, food, dust, animal dander, and mold. Your allergies are seasonal if you have symptoms just at certain times of the year. In that case, you are probably allergic to pollens from certain trees, grasses, or weeds. Allergies can be mild or severe. Over-the-counter allergy medicine may help with some symptoms. Read and follow all instructions on the label. Managing your allergies is an important part of staying healthy. Your doctor may suggest that you have tests to help find the cause of your allergies. When you know what things trigger your symptoms, you can avoid them. This can prevent allergy symptoms and other health problems. In some cases, immunotherapy might help. For this treatment, you get shots or use pills that have a small amount of certain allergens in them. Your body \"gets used to\" the allergen, so you react less to it over time.  This kind of treatment may help prevent or reduce some allergy symptoms. Follow-up care is a key part of your treatment and safety. Be sure to make and go to all appointments, and call your doctor if you are having problems. It's also a good idea to know your test results and keep a list of the medicines you take. How can you care for yourself at home? · Be safe with medicines. Take your medicines exactly as prescribed. Call your doctor if you think you are having a problem with your medicine. · During your allergy season, keep windows closed. If you need to use air-conditioning, change or clean all filters every month. Take a shower and change your clothes after you have been outside. · Stay inside when pollen counts are high. Vacuum once or twice a week. Use a vacuum  with a HEPA filter or a double-thickness filter. When should you call for help? Give an epinephrine shot if: 
? · You think you are having a severe allergic reaction. ? After giving an epinephrine shot, call 911, even if you feel better. ?Call 911 if: 
? · You have symptoms of a severe allergic reaction. These may include: 
¨ Sudden raised, red areas (hives) all over your body. ¨ Swelling of the throat, mouth, lips, or tongue. ¨ Trouble breathing. ¨ Passing out (losing consciousness). Or you may feel very lightheaded or suddenly feel weak, confused, or restless. ? · You have been given an epinephrine shot, even if you feel better. ?Call your doctor now or seek immediate medical care if: 
? · You have symptoms of an allergic reaction, such as: ¨ A rash or hives (raised, red areas on the skin). ¨ Itching. ¨ Swelling. ¨ Belly pain, nausea, or vomiting. ? Watch closely for changes in your health, and be sure to contact your doctor if: 
? · You do not get better as expected. Where can you learn more? Go to http://aurea-matheus.info/.  
Enter J912 in the search box to learn more about \"Seasonal Allergies: Care Instructions. \" Current as of: September 29, 2016 Content Version: 11.4 © 1137-6870 [employer]. Care instructions adapted under license by [employer] (which disclaims liability or warranty for this information). If you have questions about a medical condition or this instruction, always ask your healthcare professional. [address] any warranty or liability for your use of this information. Eustachian Tube Problems: Care Instructions Your Care Instructions The eustachian (say \"you-STAY-shee-un\") tubes run between the inside of the ears and the throat. They keep air pressure stable in the ears. If your eustachian tubes become blocked, the air pressure in your ears changes. The fluids from a cold can clog eustachian tubes, causing pain in the ears. A quick change in air pressure can cause eustachian tubes to close up. This might happen when an airplane changes altitude or when a  goes up or down underwater. Eustachian tube problems often clear up on their own or after antibiotic treatment. If your tubes continue to be blocked, you may need surgery. Follow-up care is a key part of your treatment and safety. Be sure to make and go to all appointments, and call your doctor if you are having problems. It's also a good idea to know your test results and keep a list of the medicines you take. How can you care for yourself at home? · To ease ear pain, apply a warm washcloth or a heating pad set on low. There may be some drainage from the ear when the heat melts earwax. Put a cloth between the heat source and your skin. Do not use a heating pad with children. · If your doctor prescribed antibiotics, take them as directed. Do not stop taking them just because you feel better. You need to take the full course of antibiotics. · Your doctor may recommend over-the-counter medicine.  Be safe with medicines. Oral or nasal decongestants may relieve ear pain. Avoid decongestants that are combined with antihistamines, which tend to cause more blockage. But if allergies seem to be the problem, your doctor may recommend a combination. Be careful with cough and cold medicines. Don't give them to children younger than 6, because they don't work for children that age and can even be harmful. For children 6 and older, always follow all the instructions carefully. Make sure you know how much medicine to give and how long to use it. And use the dosing device if one is included. When should you call for help? Call your doctor now or seek immediate medical care if: 
? · You develop sudden, complete hearing loss. ? · You have severe pain or feel dizzy. ? · You have new or increasing pus or blood draining from your ear. ? · You have redness, swelling, or pain around or behind the ear. ? Watch closely for changes in your health, and be sure to contact your doctor if: 
? · You do not get better after 2 weeks. ? · You have any new symptoms, such as itching or a feeling of fullness in the ear. Where can you learn more? Go to http://aurea-matheus.info/. Enter Y822 in the search box to learn more about \"Eustachian Tube Problems: Care Instructions. \" Current as of: May 12, 2017 Content Version: 11.4 © 9719-5615 Hot[employer]k. Care instructions adapted under license by [employer] (which disclaims liability or warranty for this information). If you have questions about a medical condition or this instruction, always ask your healthcare professional. [place] [address] any warranty or liability for your use of this information. Introducing [facility]! Dear John John: 
Thank you for requesting a [employer] account. Our records indicate that you already have an active [employer] account.   You can access your account anytime at [email or web address]. [employer] Did you know that you can access your hospital and ER discharge instructions at any time in [employer]? You can also review all of your test results from your hospital stay or ER visit. Additional Information If you have questions, please visit the Frequently Asked Questions section of the [employer] website at [email or web address] [employer]/. Remember, [employer] is NOT to be used for urgent needs. For medical emergencies, dial 911. Now available from your iPhone and Android! Please provide this summary of care documentation to your next provider. Your primary care clinician is listed as Benny Turk. If you have any questions after today's visit, please call [phone].

## 2018-05-23 NOTE — PROGRESS NOTES
[address] [facility] [place]    Office [phone], Fax [phone]    Subjective:     CC: L ear pain  History provided by patient     HPI:  Suhas Veliz is a 25 y.o. WHITE [place]  female presents with L ear pain. Significant history includes seasonal allergies, bipolar d/o, adderall. L Ear pain  - Started 1 week ago, started having pain behind the ear and anterior to ear, dull constant ache and pain/stiffness, +/- pressure  - Tried: aleve, zyrtec and nasacort  - no new changes recently on brace   - ROS: \"muscle of jaw\", maybe thinking from her braces, no fever/chills, slight decrease in hearing, some headache, no vision changes. Medication reviewed. Allergy reviewed. ROS (bolded are positive):   General Negative for fever, chills, changes in weight, changes in appetite   HEENT Negative for hearing loss, earache, congestion, sore throat   CV Negative for chest pain, palpitations, edema   Respiratory Negative for cough, shortness of breath, wheezing   GI Negative for change in bowel habits, abdominal pain, black or bloody stools, nausea or vomiting    Negative for frequency, dysuria, hematuria, vaginal discharge   MSK Negative for back pain, joint pain, muscle pain   Skin Negative for itching, rash, hives   Neuro Negative for dizziness, headache, confusion, weakness     Past Medical History:   Diagnosis Date    Abnormal Pap smear of cervix 1/25/16    ASCUS ; HPV +    Abnormal Pap smear of cervix 05/03/2017    LGSIL, although HPV is negative -> needs colpo (Mick'd 5/31/17)     Anxiety associated with depression     Carpal tunnel syndrome     flared with Mirena insertion, improved after removal. Had bilateral release (2017).     Encounter for IUD removal 05/17/2017    Inserted 5/17/17 -> removed 6/26/17 for c/o carpal tunnel flare    Encounter for Pap smear of cervix with HPV DNA cotesting 05/07/2018    Neg pap and neg HPV    HSV-1 infection 1/2016    serum screening    Low iron     Reports not being anemic. Last labs WNL in 2013 per pt. Current Outpatient Prescriptions on File Prior to Visit   Medication Sig Dispense Refill    OXcarbazepine (TRILEPTAL) 300 mg tablet TK 1 T PO HS  0    dextroamphetamine-amphetamine (ADDERALL) 30 mg tablet Take 30 mg by mouth.  escitalopram oxalate (LEXAPRO) 20 mg tablet Take 1 Tab by mouth daily. 90 Tab 1    PRENATAL NO.82/IRON/FOLATE #2 (TL FOLATE PO) Take  by mouth.  cetirizine (ZYRTEC) 10 mg tablet Take 10 mg by mouth daily.  triamcinolone (NASACORT) 55 mcg nasal inhaler 2 sprays daily. No current facility-administered medications on file prior to visit. Allergies   Allergen Reactions    Pcn [Penicillins] Rash       Past Surgical History:   Procedure Laterality Date    HX CARPAL TUNNEL RELEASE Bilateral 2017    Summer    HX COLPOSCOPY  10/21/14    ECC - benign endocervix, detatched frag of sq mucosa sugg of HPV effect; 4:00 - HPV effect, focal atypia favor reactive; 11:00 - benign.  HX TONSILLECTOMY  1994    HX WISDOM TEETH EXTRACTION         Social History     Social History    Marital status: SINGLE     Spouse name: N/A    Number of children: N/A    Years of education: N/A     Occupational History    Not on file. Social History Main Topics    Smoking status: Former Smoker     Packs/day: 0.50     Years: 7.00     Types: Cigarettes    Smokeless tobacco: Never Used      Comment: Never used vapor or e-cigs     Alcohol use 0.0 oz/week     0 Glasses of wine, 0 Cans of beer per week      Comment: Occasionally.  Drug use: No      Comment: Has used amphetamines 1 yr ago, but none presently.     Sexual activity: Not Currently     Partners: Male     Birth control/ protection: Condom     Other Topics Concern    Not on file     Social History Narrative       Patient Active Problem List   Diagnosis Code    Anxiety associated with depression F41.8    Abnormal pap [id number]       Objective:   Vitals - reviewed  Visit Vitals    /66 (BP 1 Location: Right arm, BP Patient Position: Sitting)    Pulse 73    Temp 98.5 °F (36.9 °C) (Oral)    Resp 16    Ht 5' 6\" (1.676 m)    Wt 200 lb (90.7 kg)    LMP 05/12/2018 (Approximate)    SpO2 99%    BMI 32.28 kg/m2        Physical Exam   Constitutional: She is oriented to person, place, and time. She appears well-developed. No distress. HENT:   Head: Normocephalic and atraumatic. Right Ear: External ear normal.   Left Ear: External ear normal.   Nose: Nose normal.   Mouth/Throat: Oropharynx is clear and moist. No oropharyngeal exudate. Tenderness anterior to L ear and behind ear, TMJ tendreness   Eyes: Conjunctivae and EOM are normal. Pupils are equal, round, and reactive to light. Neck: Normal range of motion. Neck supple. No thyromegaly present. Cardiovascular: Normal rate, regular rhythm and normal heart sounds. Pulmonary/Chest: Effort normal and breath sounds normal. No respiratory distress. Musculoskeletal: Normal range of motion. Neurological: She is alert and oriented to person, place, and time. Skin: Skin is warm and dry. Pertinent Labs/Studies: n/a      Assessment and orders:       ICD-10-CM ICD-9-CM    1. Eustachian tube disorder, left H69.92 381.9 naproxen (NAPROSYN) 250 mg tablet     Diagnoses and all orders for this visit:    1. Eustachian tube disorder, left (TMJ disorder). Pt is on trileptal and adderall and thus will not prescribe muscle relaxant (interaction). Given antiinflammatory to aid. Currently pt taking zyrtec and nasacort. -     naproxen (NAPROSYN) 250 mg tablet; Take 1 Tab by mouth two (2) times daily (with meals). Follow-up Disposition:  Return if symptoms worsen or fail to improve. Pt was discussed and seen by Dr Selina Cao (attending physician). I have reviewed patient medical and social history and medications. I have reviewed pertinent labs results and other data.  I have discussed the diagnosis with the patient and the intended plan as seen in the above orders. The patient has received an after-visit summary and questions were answered concerning future plans. I have discussed medication side effects and warnings with the patient as well.     Bert Farley MD  Resident [facility]  05/23/18

## 2018-05-23 NOTE — PROGRESS NOTES
Left message on machine for patient to call the office regarding pap and blood work results. Updated tickler and added to pmh.

## 2018-05-23 NOTE — PROGRESS NOTES
Chief Complaint   Patient presents with    Ear Pain     left, x a week       1. Have you been to the ER, urgent care clinic since your last visit? Hospitalized since your last visit? No    2. Have you seen or consulted any other health care providers outside of the [address] [place] [address] since your last visit? Include any pap smears or colon screening.  No

## 2018-05-29 NOTE — PROGRESS NOTES
I saw and evaluated the patient, performing the key elements of the service. I discussed the findings, assessment and plan with the resident and agree with the resident's findings and plan as documented in the resident's note.   Pt seen with Dr Jenny Metzger

## 2018-05-30 NOTE — PROGRESS NOTES
Left message on machine for patient to call the office or to respond to the [employer] message sent regarding pap and std blood results.

## 2018-06-25 DIAGNOSIS — H69.92 EUSTACHIAN TUBE DISORDER, LEFT: ICD-10-CM

## 2018-06-25 RX ORDER — NAPROXEN 250 MG/1
TABLET ORAL
Qty: 30 TAB | Refills: 0 | Status: SHIPPED | OUTPATIENT
Start: 2018-06-25

## 2021-02-17 NOTE — PROGRESS NOTES
Irregular cycles. For IUD insertion today. LGSIL pap. Will RTO for colpo. [facility]  OFFICE PROCEDURE PROGRESS NOTE        Chart reviewed for the following:   Juan Luis Santos, have reviewed the History, Physical and updated the Allergic reactions for [address] performed immediately prior to start of procedure:   Hollie GEORGE, have performed the following reviews on Yon Mike prior to the start of the procedure:            * Patient was identified by name and date of birth   * Agreement on procedure being performed was verified  * Risks and Benefits explained to the patient  * Procedure site verified and marked as necessary  * Patient was positioned for comfort  * Consent was signed and verified     Time: 2:30pm      Date of procedure: 2017    Procedure performed by:  Devin Altamirano MD    Provider assisted by: Sandra URIBE    Patient assisted by: self    How tolerated by patient: tolerated the procedure well with no complications    Post Procedural Pain Scale: 2 - Hurts Little Bit    Comments: none      Mirena IUD INSERTION  Indications:  Yon Mike is a ,  32 y.o. female [facility] Patient's last menstrual period was 2017 (exact date). Her LMP was normal in duration and amount of flow. She  presents for insertion of an IUD. The risks, benefits and alternatives of IUD insertion were discussed in detail at last visit. She also has reviewed Mirena information. She has elected to proceed with the insertion today and she states she has no further questions. A urine pregnancy test was negative   Procedure: The pelvic exam revealed normal external genitalia. On bimanual exam the uterus was anteverted and normal in size with no tenderness present. A speculum was inserted into the vagina and the cervix was visualized. The cervix was prepped with zephiran solution.  The anterior lip of the cervix was grasped with a Madison Hurtado RN Case Manager  Inpatient Care Coordination  [facility]   [phone]     single toothed tenaculum after infiltrating with 2cc 1% lidocaine. The uterus was sounded with a Benites sound to 8 centimeters. A Mirena was then inserted without difficulty. The string was cut to 3 centimeters. She experienced a mild  amount of cramping. Post Procedure Status:   She tolerated the procedure with mild discomfort. The patient was observed for 15 minutes after the insertion. There were no complications. Patient was discharged in stable condition. The patient received Mirena lot number Stana Newland and EXP: 01/2020.